# Patient Record
Sex: MALE | Race: WHITE | NOT HISPANIC OR LATINO | Employment: FULL TIME | URBAN - METROPOLITAN AREA
[De-identification: names, ages, dates, MRNs, and addresses within clinical notes are randomized per-mention and may not be internally consistent; named-entity substitution may affect disease eponyms.]

---

## 2018-06-18 ENCOUNTER — OFFICE VISIT (OUTPATIENT)
Dept: INTERNAL MEDICINE | Facility: IMAGING CENTER | Age: 63
End: 2018-06-18
Payer: MEDICARE

## 2018-06-18 VITALS
RESPIRATION RATE: 14 BRPM | DIASTOLIC BLOOD PRESSURE: 78 MMHG | HEART RATE: 58 BPM | OXYGEN SATURATION: 96 % | TEMPERATURE: 98.8 F | WEIGHT: 186 LBS | HEIGHT: 70 IN | BODY MASS INDEX: 26.63 KG/M2 | SYSTOLIC BLOOD PRESSURE: 126 MMHG

## 2018-06-18 DIAGNOSIS — R10.11 RIGHT UPPER QUADRANT ABDOMINAL PAIN: ICD-10-CM

## 2018-06-18 DIAGNOSIS — Z12.5 SCREENING FOR PROSTATE CANCER: ICD-10-CM

## 2018-06-18 DIAGNOSIS — Z79.01 LONG TERM CURRENT USE OF ANTICOAGULANT: ICD-10-CM

## 2018-06-18 DIAGNOSIS — Z80.0 FAMILY HISTORY OF COLON CANCER: ICD-10-CM

## 2018-06-18 DIAGNOSIS — D68.51 FACTOR 5 LEIDEN MUTATION, HETEROZYGOUS (HCC): ICD-10-CM

## 2018-06-18 DIAGNOSIS — Z86.711 HISTORY OF PULMONARY EMBOLUS (PE): ICD-10-CM

## 2018-06-18 DIAGNOSIS — E55.9 VITAMIN D DEFICIENCY: ICD-10-CM

## 2018-06-18 DIAGNOSIS — E78.00 HYPERCHOLESTEROLEMIA: ICD-10-CM

## 2018-06-18 DIAGNOSIS — E11.9 TYPE 2 DIABETES MELLITUS WITHOUT COMPLICATION, WITHOUT LONG-TERM CURRENT USE OF INSULIN (HCC): ICD-10-CM

## 2018-06-18 DIAGNOSIS — D68.59 HYPERCOAGULATION SYNDROME (HCC): ICD-10-CM

## 2018-06-18 DIAGNOSIS — Z86.718 HISTORY OF DVT (DEEP VEIN THROMBOSIS): ICD-10-CM

## 2018-06-18 PROCEDURE — 99214 OFFICE O/P EST MOD 30 MIN: CPT | Performed by: INTERNAL MEDICINE

## 2018-06-18 RX ORDER — RANITIDINE HCL 75 MG
75 TABLET ORAL EVERY EVENING
Qty: 30 TAB | Refills: 11 | COMMUNITY
Start: 2018-06-18 | End: 2020-06-12 | Stop reason: SINTOL

## 2018-06-18 RX ORDER — MULTIVIT-MIN/IRON/FOLIC ACID/K 18-600-40
1 CAPSULE ORAL DAILY
Qty: 30 CAP | COMMUNITY
Start: 2018-06-18

## 2018-06-18 ASSESSMENT — ENCOUNTER SYMPTOMS
WEIGHT LOSS: 0
DIZZINESS: 0
VOMITING: 0
CHILLS: 0
DIARRHEA: 0
BLOOD IN STOOL: 0
CONSTIPATION: 0
FEVER: 0
PALPITATIONS: 0
DIAPHORESIS: 0
SHORTNESS OF BREATH: 0
COUGH: 0
HEARTBURN: 1

## 2018-06-18 ASSESSMENT — PATIENT HEALTH QUESTIONNAIRE - PHQ9: CLINICAL INTERPRETATION OF PHQ2 SCORE: 0

## 2018-06-18 NOTE — PROGRESS NOTES
Established Patient Note   HPI:        States he has been having pain right lower abdominal pain beginning about one month ago has been intermittent and sharp with short duration. He cannot associate it with food.    Past Medical History:   Diagnosis Date   • Factor 5 Leiden mutation, heterozygous ([facility]) 6/18/2018   • Family history of colon cancer 6/18/2018   • History of DVT (deep vein thrombosis) 6/18/2018    2003 nonprovoked and July 2016 RLE nonprovoked   • History of pulmonary embolus (PE) 6/18/2018   • Hypercoagulation syndrome ([facility]) 6/18/2018   • Long term current use of anticoagulant 6/18/2018   • Type 2 diabetes mellitus without complication, without long-term current use of insulin ([facility]) 6/18/2018   • Vitamin D deficiency 6/18/2018       Current Outpatient Prescriptions   Medication Sig Dispense Refill   • rivaroxaban (XARELTO) 20 MG Tab tablet Take 20 mg by mouth with dinner.     • Cholecalciferol (VITAMIN D) 2000 units Cap Take 1 Cap by mouth every day. 30 Cap    • ranitidine (ZANTAC 75) 75 MG tablet Take 1 Tab by mouth every evening. 30 Tab 11     No current facility-administered medications for this visit.          No Known Allergies      Social History   Substance Use Topics   • Smoking status: Never Smoker   • Smokeless tobacco: Never Used   • Alcohol use Yes      Comment: 5-6 mixed drinks a week     History   Alcohol Use   • Yes     Comment: 5-6 mixed drinks a week     History   Drug Use No       Review of Systems   Constitutional: Negative for chills, diaphoresis, fever, malaise/fatigue and weight loss.   Respiratory: Negative for cough and shortness of breath.    Cardiovascular: Negative for chest pain, palpitations and leg swelling.   Gastrointestinal: Positive for heartburn. Negative for blood in stool, constipation, diarrhea, melena and vomiting.        Zantac 75 mg once a day to control heartburn   Genitourinary: Negative for dysuria.        Nocturia 2-4 times a night for years. No straining  "to urinate   Neurological: Negative for dizziness.       Ambulatory Vitals  /78   Pulse (!) 58   Temp 37.1 °C (98.8 °F)   Resp 14   Ht 1.778 m (5' 10\")   Wt 84.4 kg (186 lb)   SpO2 96%   BMI 26.69 kg/m²     Physical Exam   Constitutional: He is well-developed, well-nourished, and in no distress. No distress.   Neck: Neck supple. No JVD present. No thyromegaly present.   Cardiovascular: Normal rate, regular rhythm, normal heart sounds and intact distal pulses.  Exam reveals no gallop and no friction rub.    No murmur heard.  No carotid bruits   Pulmonary/Chest: Effort normal and breath sounds normal. He has no wheezes. He has no rales.   Abdominal: Soft. He exhibits no distension and no mass. There is tenderness. There is no guarding.   Mild tenderness RUQ   Genitourinary: Prostate normal.   Genitourinary Comments: No flank tenderness   Musculoskeletal: He exhibits no edema.   Neurological: He is alert. No cranial nerve deficit. Gait normal.   Skin: Skin is warm and dry. He is not diaphoretic.   Psychiatric: Mood and affect normal.         Assessment and Plan:     1. Right upper quadrant abdominal pain  US-ABDOMEN COMPLETE SURVEY    CBC WITH DIFF/PLATELET    URINALYSIS, COMPLETE    COMP METABOLIC PANEL   2. Factor 5 Leiden mutation, heterozygous (HCC)     3. History of pulmonary embolus (PE)     4. History of DVT (deep vein thrombosis)     5. Type 2 diabetes mellitus without complication, without long-term current use of insulin (HCC)  NMR LIPOPROFILE    HEMOGLOBIN A1C    MICROALBUM. UR RANDOM    COMP METABOLIC PANEL   6. Vitamin D deficiency  Cholecalciferol (VITAMIN D) 2000 units Cap    VITAMIN D 25-HYDROXY   7. Hypercoagulation syndrome (HCC)  TSH   8. Family history of colon cancer  CBC WITH DIFF/PLATELET   9. Long term current use of anticoagulant  CBC WITH DIFF/PLATELET    URINALYSIS, COMPLETE   10. Hypercholesterolemia  NMR LIPOPROFILE    CRP HIGH SENSITIVE    WESTERGREN SED RATE   11. Screening " for prostate cancer  PSA, SERUM (SERIAL MONITOR)    URINALYSIS, COMPLETE     Follow up 2 weeks after blood work and US abdomen completed unless symptoms worsen.    Face to face time: 45 minutes with greater than 50% of time spent with direct patient contact and medical management.       Prosper Duenas M.D.

## 2018-06-19 ENCOUNTER — NON-PROVIDER VISIT (OUTPATIENT)
Dept: INTERNAL MEDICINE | Facility: IMAGING CENTER | Age: 63
End: 2018-06-19
Payer: MEDICARE

## 2018-06-19 ENCOUNTER — HOSPITAL ENCOUNTER (OUTPATIENT)
Facility: MEDICAL CENTER | Age: 63
End: 2018-06-19
Attending: INTERNAL MEDICINE
Payer: MEDICARE

## 2018-06-19 LAB
25(OH)D3 SERPL-MCNC: 43 NG/ML (ref 30–100)
ALBUMIN SERPL BCP-MCNC: 5.1 G/DL (ref 3.2–4.9)
ALBUMIN/GLOB SERPL: 2.2 G/DL
ALP SERPL-CCNC: 47 U/L (ref 30–99)
ALT SERPL-CCNC: 22 U/L (ref 2–50)
ANION GAP SERPL CALC-SCNC: 8 MMOL/L (ref 0–11.9)
APPEARANCE UR: CLEAR
AST SERPL-CCNC: 20 U/L (ref 12–45)
BASOPHILS # BLD AUTO: 1.1 % (ref 0–1.8)
BASOPHILS # BLD: 0.06 K/UL (ref 0–0.12)
BILIRUB SERPL-MCNC: 0.7 MG/DL (ref 0.1–1.5)
BILIRUB UR QL STRIP.AUTO: NEGATIVE
BUN SERPL-MCNC: 17 MG/DL (ref 8–22)
CALCIUM SERPL-MCNC: 9.5 MG/DL (ref 8.5–10.5)
CHLORIDE SERPL-SCNC: 102 MMOL/L (ref 96–112)
CO2 SERPL-SCNC: 26 MMOL/L (ref 20–33)
COLOR UR: YELLOW
CREAT SERPL-MCNC: 1 MG/DL (ref 0.5–1.4)
CREAT UR-MCNC: 13.7 MG/DL
CRP SERPL HS-MCNC: 0.13 MG/DL (ref 0–0.75)
EOSINOPHIL # BLD AUTO: 0.11 K/UL (ref 0–0.51)
EOSINOPHIL NFR BLD: 2 % (ref 0–6.9)
ERYTHROCYTE [DISTWIDTH] IN BLOOD BY AUTOMATED COUNT: 46.5 FL (ref 35.9–50)
ERYTHROCYTE [SEDIMENTATION RATE] IN BLOOD BY WESTERGREN METHOD: 8 MM/HOUR (ref 0–20)
EST. AVERAGE GLUCOSE BLD GHB EST-MCNC: 123 MG/DL
GLOBULIN SER CALC-MCNC: 2.3 G/DL (ref 1.9–3.5)
GLUCOSE SERPL-MCNC: 110 MG/DL (ref 65–99)
GLUCOSE UR STRIP.AUTO-MCNC: NEGATIVE MG/DL
HBA1C MFR BLD: 5.9 % (ref 0–5.6)
HCT VFR BLD AUTO: 46.6 % (ref 42–52)
HGB BLD-MCNC: 15.4 G/DL (ref 14–18)
IMM GRANULOCYTES # BLD AUTO: 0.04 K/UL (ref 0–0.11)
IMM GRANULOCYTES NFR BLD AUTO: 0.7 % (ref 0–0.9)
KETONES UR STRIP.AUTO-MCNC: NEGATIVE MG/DL
LEUKOCYTE ESTERASE UR QL STRIP.AUTO: NEGATIVE
LYMPHOCYTES # BLD AUTO: 1.91 K/UL (ref 1–4.8)
LYMPHOCYTES NFR BLD: 34.5 % (ref 22–41)
MCH RBC QN AUTO: 31.9 PG (ref 27–33)
MCHC RBC AUTO-ENTMCNC: 33 G/DL (ref 33.7–35.3)
MCV RBC AUTO: 96.5 FL (ref 81.4–97.8)
MICRO URNS: NORMAL
MICROALBUMIN UR-MCNC: <0.7 MG/DL
MICROALBUMIN/CREAT UR: NORMAL MG/G (ref 0–30)
MONOCYTES # BLD AUTO: 0.46 K/UL (ref 0–0.85)
MONOCYTES NFR BLD AUTO: 8.3 % (ref 0–13.4)
NEUTROPHILS # BLD AUTO: 2.95 K/UL (ref 1.82–7.42)
NEUTROPHILS NFR BLD: 53.4 % (ref 44–72)
NITRITE UR QL STRIP.AUTO: NEGATIVE
NRBC # BLD AUTO: 0 K/UL
NRBC BLD-RTO: 0 /100 WBC
PH UR STRIP.AUTO: 6 [PH]
PLATELET # BLD AUTO: 219 K/UL (ref 164–446)
PMV BLD AUTO: 9.9 FL (ref 9–12.9)
POTASSIUM SERPL-SCNC: 4.1 MMOL/L (ref 3.6–5.5)
PROT SERPL-MCNC: 7.4 G/DL (ref 6–8.2)
PROT UR QL STRIP: NEGATIVE MG/DL
PSA SERPL-MCNC: 1.4 NG/ML (ref 0–4)
RBC # BLD AUTO: 4.83 M/UL (ref 4.7–6.1)
RBC UR QL AUTO: NEGATIVE
SODIUM SERPL-SCNC: 136 MMOL/L (ref 135–145)
SP GR UR STRIP.AUTO: 1
TSH SERPL DL<=0.005 MIU/L-ACNC: 2.58 UIU/ML (ref 0.38–5.33)
UROBILINOGEN UR STRIP.AUTO-MCNC: 0.2 MG/DL
WBC # BLD AUTO: 5.5 K/UL (ref 4.8–10.8)

## 2018-06-19 PROCEDURE — 82043 UR ALBUMIN QUANTITATIVE: CPT

## 2018-06-19 PROCEDURE — 82570 ASSAY OF URINE CREATININE: CPT

## 2018-06-19 PROCEDURE — 84153 ASSAY OF PSA TOTAL: CPT | Mod: GA

## 2018-06-19 PROCEDURE — 80053 COMPREHEN METABOLIC PANEL: CPT

## 2018-06-19 PROCEDURE — 85652 RBC SED RATE AUTOMATED: CPT

## 2018-06-19 PROCEDURE — 84443 ASSAY THYROID STIM HORMONE: CPT

## 2018-06-19 PROCEDURE — 85025 COMPLETE CBC W/AUTO DIFF WBC: CPT

## 2018-06-19 PROCEDURE — 82306 VITAMIN D 25 HYDROXY: CPT

## 2018-06-19 PROCEDURE — 81003 URINALYSIS AUTO W/O SCOPE: CPT

## 2018-06-19 PROCEDURE — 83036 HEMOGLOBIN GLYCOSYLATED A1C: CPT | Mod: GA

## 2018-06-19 PROCEDURE — 86140 C-REACTIVE PROTEIN: CPT

## 2018-06-21 ENCOUNTER — HOSPITAL ENCOUNTER (OUTPATIENT)
Dept: RADIOLOGY | Facility: MEDICAL CENTER | Age: 63
End: 2018-06-21
Attending: INTERNAL MEDICINE
Payer: MEDICARE

## 2018-06-21 DIAGNOSIS — R10.11 RIGHT UPPER QUADRANT ABDOMINAL PAIN: ICD-10-CM

## 2018-06-21 PROCEDURE — 76700 US EXAM ABDOM COMPLETE: CPT

## 2018-06-23 LAB
CHOLEST SERPL-MCNC: 193 MG/DL (ref 100–199)
HDL SERPL-SCNC: 34.4 UMOL/L
HDLC SERPL-MCNC: 60 MG/DL
LDL SERPL QN: 21.5 NM
LDL SERPL-SCNC: 1300 NMOL/L
LDL SMALL SERPL-SCNC: 298 NMOL/L
LDLC SERPL CALC-MCNC: 101 MG/DL (ref 0–99)
LP-IR SCORE SERPL: 29
TRIGL SERPL-MCNC: 160 MG/DL (ref 0–149)

## 2018-08-06 ENCOUNTER — OFFICE VISIT (OUTPATIENT)
Dept: INTERNAL MEDICINE | Facility: IMAGING CENTER | Age: 63
End: 2018-08-06
Payer: MEDICARE

## 2018-08-06 VITALS
WEIGHT: 186 LBS | SYSTOLIC BLOOD PRESSURE: 120 MMHG | BODY MASS INDEX: 26.63 KG/M2 | DIASTOLIC BLOOD PRESSURE: 62 MMHG | TEMPERATURE: 99 F | RESPIRATION RATE: 14 BRPM | HEART RATE: 64 BPM | OXYGEN SATURATION: 98 % | HEIGHT: 70 IN

## 2018-08-06 DIAGNOSIS — Z86.718 HISTORY OF DVT (DEEP VEIN THROMBOSIS): ICD-10-CM

## 2018-08-06 DIAGNOSIS — D68.51 FACTOR 5 LEIDEN MUTATION, HETEROZYGOUS (HCC): ICD-10-CM

## 2018-08-06 DIAGNOSIS — Z79.899 LONG TERM USE OF DRUG: ICD-10-CM

## 2018-08-06 DIAGNOSIS — R10.11 RIGHT UPPER QUADRANT ABDOMINAL PAIN: ICD-10-CM

## 2018-08-06 DIAGNOSIS — E11.9 TYPE 2 DIABETES MELLITUS WITHOUT COMPLICATION, WITHOUT LONG-TERM CURRENT USE OF INSULIN (HCC): ICD-10-CM

## 2018-08-06 PROCEDURE — 99214 OFFICE O/P EST MOD 30 MIN: CPT | Performed by: INTERNAL MEDICINE

## 2018-08-06 ASSESSMENT — ENCOUNTER SYMPTOMS: PALPITATIONS: 0

## 2018-08-06 NOTE — PROGRESS NOTES
"Established Patient Note   HPI:        States pain in right side is still present but not severe. The pain is typically short lived lasting seconds. He cannot identify any specific position or activity that aggravates the pain. Eating makes not difference affecting the symptoms.    Past Medical History:   Diagnosis Date   • Factor 5 Leiden mutation, heterozygous ([facility]) 6/18/2018   • Family history of colon cancer 6/18/2018   • History of DVT (deep vein thrombosis) 6/18/2018    2003 nonprovoked and July 2016 RLE nonprovoked   • History of pulmonary embolus (PE) 6/18/2018   • Hypercoagulation syndrome ([facility]) 6/18/2018   • Long term current use of anticoagulant 6/18/2018   • Type 2 diabetes mellitus without complication, without long-term current use of insulin ([facility]) 6/18/2018   • Vitamin D deficiency 6/18/2018       Current Outpatient Prescriptions   Medication Sig Dispense Refill   • rivaroxaban (XARELTO) 20 MG Tab tablet Take 20 mg by mouth with dinner.     • Cholecalciferol (VITAMIN D) 2000 units Cap Take 1 Cap by mouth every day. 30 Cap    • ranitidine (ZANTAC 75) 75 MG tablet Take 1 Tab by mouth every evening. 30 Tab 11     No current facility-administered medications for this visit.          No Known Allergies      Social History   Substance Use Topics   • Smoking status: Never Smoker   • Smokeless tobacco: Never Used   • Alcohol use Yes      Comment: 5-6 mixed drinks a week     History   Alcohol Use   • Yes     Comment: 5-6 mixed drinks a week     History   Drug Use No       Review of Systems   Cardiovascular: Negative for chest pain, palpitations and leg swelling.       Ambulatory Vitals  /62   Pulse 64   Temp 37.2 °C (99 °F)   Resp 14   Ht 1.778 m (5' 10\")   Wt 84.4 kg (186 lb)   SpO2 98%   BMI 26.69 kg/m²     Physical Exam   Constitutional: He is well-developed, well-nourished, and in no distress. No distress.   Neck: No JVD present.   Cardiovascular: Normal rate, regular rhythm and normal heart " sounds.  Exam reveals no gallop and no friction rub.    No murmur heard.  Pulmonary/Chest: Effort normal and breath sounds normal. He has no wheezes. He has no rales.   Abdominal: Soft. He exhibits no distension and no mass. There is no tenderness. There is no guarding.   Musculoskeletal: He exhibits no edema.   Neurological: He is alert. Gait normal.   Skin: He is not diaphoretic.   Psychiatric: Mood and affect normal.       Component      Latest Ref Rng & Units 6/19/2018   WBC      4.8 - 10.8 K/uL 5.5   RBC      4.70 - 6.10 M/uL 4.83   Hemoglobin      14.0 - 18.0 g/dL 15.4   Hematocrit      42.0 - 52.0 % 46.6   MCV      81.4 - 97.8 fL 96.5   MCH      27.0 - 33.0 pg 31.9   MCHC      33.7 - 35.3 g/dL 33.0 (L)   RDW      35.9 - 50.0 fL 46.5   Platelet Count      164 - 446 K/uL 219   MPV      9.0 - 12.9 fL 9.9   Neutrophils-Polys      44.00 - 72.00 % 53.40   Lymphocytes      22.00 - 41.00 % 34.50   Monocytes      0.00 - 13.40 % 8.30   Eosinophils      0.00 - 6.90 % 2.00   Basophils      0.00 - 1.80 % 1.10   Immature Granulocytes      0.00 - 0.90 % 0.70   Nucleated RBC      /100 WBC 0.00   Neutrophils (Absolute)      1.82 - 7.42 K/uL 2.95   Lymphs (Absolute)      1.00 - 4.80 K/uL 1.91   Monos (Absolute)      0.00 - 0.85 K/uL 0.46   Eos (Absolute)      0.00 - 0.51 K/uL 0.11   Baso (Absolute)      0.00 - 0.12 K/uL 0.06   Immature Granulocytes (abs)      0.00 - 0.11 K/uL 0.04   NRBC (Absolute)      K/uL 0.00   Sodium      135 - 145 mmol/L 136   Potassium      3.6 - 5.5 mmol/L 4.1   Chloride      96 - 112 mmol/L 102   Co2      20 - 33 mmol/L 26   Anion Gap      0.0 - 11.9 8.0   Glucose      65 - 99 mg/dL 110 (H)   Bun      8 - 22 mg/dL 17   Creatinine      0.50 - 1.40 mg/dL 1.00   Calcium      8.5 - 10.5 mg/dL 9.5   AST(SGOT)      12 - 45 U/L 20   ALT(SGPT)      2 - 50 U/L 22   Alkaline Phosphatase      30 - 99 U/L 47   Total Bilirubin      0.1 - 1.5 mg/dL 0.7   Albumin      3.2 - 4.9 g/dL 5.1 (H)   Total Protein      6.0  - 8.2 g/dL 7.4   Globulin      1.9 - 3.5 g/dL 2.3   A-G Ratio      g/dL 2.2   Color       Yellow   Character       Clear   Specific Gravity      <1.035 1.005   Ph      5.0 - 8.0 6.0   Glucose      Negative mg/dL Negative   Ketones      Negative mg/dL Negative   Protein      Negative mg/dL Negative   Bilirubin      Negative Negative   Urobilinogen, Urine      Negative 0.2   Nitrite      Negative Negative   Leukocyte Esterase      Negative Negative   Occult Blood      Negative Negative   Micro Urine Req       see below   Creatinine, Urine      mg/dL 13.70   Microalbumin, Urine Random      mg/dL <0.7   Micro Alb Creat Ratio      0 - 30 mg/g see below   Glycohemoglobin      0.0 - 5.6 % 5.9 (H)   Estim. Avg Glu      mg/dL 123   GFR If African American      >60 mL/min/1.73 m 2 >60   GFR If Non African American      >60 mL/min/1.73 m 2 >60   25-Hydroxy   Vitamin D 25      30 - 100 ng/mL 43   Prostatic Specific Antigen Tot      0.00 - 4.00 ng/mL 1.40   TSH      0.380 - 5.330 uIU/mL 2.580   Sed Rate Westergren      0 - 20 mm/hour 8   Stat C-Reactive Protein      0.00 - 0.75 mg/dL 0.13   Narrative       6/21/2018 9:14 AM    HISTORY/REASON FOR EXAM:  Right-sided abdominal pain      TECHNIQUE/EXAM DESCRIPTION AND NUMBER OF VIEWS:  Complete abdomen survey.    COMPARISON: None    FINDINGS:  Hepatic echotexture is increased. No hepatic mass is identified. Evaluation for mass is limited in the setting of increased hepatic echotexture. The liver measures 18.39 cm.    The gallbladder is normal. There is no evidence of cholelithiasis. The gallbladder wall thickness measures 0.18 cm  There is no pericholecystic fluid.    The common duct measures 0.21 cm.    The visualized pancreas is unremarkable.    The visualized aorta is normal in caliber.    Intrahepatic IVC is patent.    The portal vein is patent with hepatopetal flow.    The right kidney measures 10.38 cm.  The left kidney measures 11.08 cm.  There is no hydronephrosis.    The  spleen measures 9.92 cm maximally.    The bladder demonstrates no focal wall abnormality.    There is no ascites.   Impression       1.  Increased hepatic echotexture is likely related to fatty infiltration. Hepatocellular disease can have a similar appearance.    2.  Hepatomegaly   Reading Provider Reading Date   Wanda Whatley M.D. Jun 21, 2018   Signing Provider Signing Date Signing Time   Wanda Whatley M.D. Jun 21, 2018 10:06 AM       Assessment and Plan:     1. Right upper quadrant abdominal pain  CT-ABDOMEN WITH   2. Factor 5 Leiden mutation, heterozygous (HCC)     3. History of DVT (deep vein thrombosis)  CT-ABDOMEN WITH   4. Type 2 diabetes mellitus without complication, without long-term current use of insulin (HCC)  COMP METABOLIC PANEL    HEMOGLOBIN A1C    NMR LIPOPROFILE    Controlled with diet    5. Long term use of drug  COMP METABOLIC PANEL     Follow up 3 months with blood work one week before appointment.    Face to face time: 45 minutes with greater than 50% of time spent with direct patient contact and medical management.       Prosper Duenas M.D.

## 2018-08-28 DIAGNOSIS — R10.11 ABDOMINAL PAIN, RIGHT UPPER QUADRANT: ICD-10-CM

## 2018-08-28 DIAGNOSIS — Z86.718 PERSONAL HISTORY OF DEEP VEIN THROMBOSIS: ICD-10-CM

## 2018-09-21 ENCOUNTER — NON-PROVIDER VISIT (OUTPATIENT)
Dept: INTERNAL MEDICINE | Facility: IMAGING CENTER | Age: 63
End: 2018-09-21
Payer: MEDICARE

## 2018-09-21 ENCOUNTER — HOSPITAL ENCOUNTER (OUTPATIENT)
Facility: MEDICAL CENTER | Age: 63
End: 2018-09-21
Attending: INTERNAL MEDICINE
Payer: MEDICARE

## 2018-09-21 LAB
ALBUMIN SERPL BCP-MCNC: 4.9 G/DL (ref 3.2–4.9)
ALBUMIN/GLOB SERPL: 1.5 G/DL
ALP SERPL-CCNC: 52 U/L (ref 30–99)
ALT SERPL-CCNC: 28 U/L (ref 2–50)
ANION GAP SERPL CALC-SCNC: 10 MMOL/L (ref 0–11.9)
AST SERPL-CCNC: 26 U/L (ref 12–45)
BILIRUB SERPL-MCNC: 0.9 MG/DL (ref 0.1–1.5)
BUN SERPL-MCNC: 25 MG/DL (ref 8–22)
CALCIUM SERPL-MCNC: 10 MG/DL (ref 8.5–10.5)
CHLORIDE SERPL-SCNC: 102 MMOL/L (ref 96–112)
CO2 SERPL-SCNC: 24 MMOL/L (ref 20–33)
CREAT SERPL-MCNC: 1.23 MG/DL (ref 0.5–1.4)
EST. AVERAGE GLUCOSE BLD GHB EST-MCNC: 131 MG/DL
GLOBULIN SER CALC-MCNC: 3.3 G/DL (ref 1.9–3.5)
GLUCOSE SERPL-MCNC: 115 MG/DL (ref 65–99)
HBA1C MFR BLD: 6.2 % (ref 0–5.6)
POTASSIUM SERPL-SCNC: 4.4 MMOL/L (ref 3.6–5.5)
PROT SERPL-MCNC: 8.2 G/DL (ref 6–8.2)
SODIUM SERPL-SCNC: 136 MMOL/L (ref 135–145)

## 2018-09-21 PROCEDURE — 80053 COMPREHEN METABOLIC PANEL: CPT

## 2018-09-21 PROCEDURE — 83036 HEMOGLOBIN GLYCOSYLATED A1C: CPT | Mod: GA

## 2018-09-21 PROCEDURE — 83704 LIPOPROTEIN BLD QUAN PART: CPT

## 2018-09-21 PROCEDURE — 80061 LIPID PANEL: CPT | Mod: XU

## 2018-09-25 LAB
CHOLEST SERPL-MCNC: 228 MG/DL
HDL PARTICAL NO Q4363: >41 UMOL/L
HDL SIZE Q4361: 8.4 NM
HDLC SERPL-MCNC: 61 MG/DL (ref 40–59)
HLD.LARGE SERPL-SCNC: 3.4 UMOL/L
L VLDL PART NO Q4357: <1.5 NMOL/L
LDL SERPL QN: 20.9 NM
LDL SERPL-SCNC: 1617 NMOL/L
LDL SMALL SERPL-SCNC: 616 NMOL/L
LDLC SERPL CALC-MCNC: 134 MG/DL
PATHOLOGY STUDY: ABNORMAL
TRIGL SERPL-MCNC: 163 MG/DL (ref 30–149)
VLDL SIZE Q4362: 43.2 NM

## 2018-09-30 ENCOUNTER — HOSPITAL ENCOUNTER (OUTPATIENT)
Dept: RADIOLOGY | Facility: MEDICAL CENTER | Age: 63
End: 2018-09-30
Attending: INTERNAL MEDICINE
Payer: MEDICARE

## 2018-09-30 DIAGNOSIS — Z86.718 PERSONAL HISTORY OF DEEP VEIN THROMBOSIS: ICD-10-CM

## 2018-09-30 DIAGNOSIS — R10.11 ABDOMINAL PAIN, RIGHT UPPER QUADRANT: ICD-10-CM

## 2018-09-30 PROCEDURE — 74160 CT ABDOMEN W/CONTRAST: CPT

## 2018-09-30 PROCEDURE — 700117 HCHG RX CONTRAST REV CODE 255: Performed by: INTERNAL MEDICINE

## 2018-09-30 RX ADMIN — IOHEXOL 100 ML: 350 INJECTION, SOLUTION INTRAVENOUS at 09:41

## 2018-09-30 RX ADMIN — IOHEXOL 50 ML: 240 INJECTION, SOLUTION INTRATHECAL; INTRAVASCULAR; INTRAVENOUS; ORAL at 08:38

## 2019-09-30 DIAGNOSIS — D68.51 FACTOR 5 LEIDEN MUTATION, HETEROZYGOUS (HCC): ICD-10-CM

## 2019-09-30 DIAGNOSIS — Z00.00 WELLNESS EXAMINATION: ICD-10-CM

## 2019-09-30 DIAGNOSIS — D68.59 HYPERCOAGULATION SYNDROME (HCC): ICD-10-CM

## 2019-09-30 DIAGNOSIS — Z11.59 NEED FOR HEPATITIS C SCREENING TEST: ICD-10-CM

## 2019-09-30 DIAGNOSIS — Z12.5 SCREENING FOR PROSTATE CANCER: ICD-10-CM

## 2019-09-30 DIAGNOSIS — E55.9 VITAMIN D DEFICIENCY: ICD-10-CM

## 2019-09-30 DIAGNOSIS — E11.9 TYPE 2 DIABETES MELLITUS WITHOUT COMPLICATION, WITHOUT LONG-TERM CURRENT USE OF INSULIN (HCC): ICD-10-CM

## 2019-09-30 DIAGNOSIS — Z79.01 LONG TERM CURRENT USE OF ANTICOAGULANT: ICD-10-CM

## 2019-10-02 ENCOUNTER — HOSPITAL ENCOUNTER (OUTPATIENT)
Dept: LAB | Facility: MEDICAL CENTER | Age: 64
End: 2019-10-02
Attending: INTERNAL MEDICINE
Payer: MEDICARE

## 2019-10-02 DIAGNOSIS — E11.9 TYPE 2 DIABETES MELLITUS WITHOUT COMPLICATION, WITHOUT LONG-TERM CURRENT USE OF INSULIN (HCC): ICD-10-CM

## 2019-10-02 DIAGNOSIS — D68.59 HYPERCOAGULATION SYNDROME (HCC): ICD-10-CM

## 2019-10-02 DIAGNOSIS — E55.9 VITAMIN D DEFICIENCY: ICD-10-CM

## 2019-10-02 DIAGNOSIS — D68.51 FACTOR 5 LEIDEN MUTATION, HETEROZYGOUS (HCC): ICD-10-CM

## 2019-10-02 DIAGNOSIS — Z79.01 LONG TERM CURRENT USE OF ANTICOAGULANT: ICD-10-CM

## 2019-10-02 DIAGNOSIS — Z00.00 WELLNESS EXAMINATION: ICD-10-CM

## 2019-10-02 DIAGNOSIS — Z12.5 SCREENING FOR PROSTATE CANCER: ICD-10-CM

## 2019-10-02 DIAGNOSIS — Z11.59 NEED FOR HEPATITIS C SCREENING TEST: ICD-10-CM

## 2019-10-02 LAB
25(OH)D3 SERPL-MCNC: 38 NG/ML (ref 30–100)
ALBUMIN SERPL BCP-MCNC: 4.6 G/DL (ref 3.2–4.9)
ALBUMIN/GLOB SERPL: 1.8 G/DL
ALP SERPL-CCNC: 52 U/L (ref 30–99)
ALT SERPL-CCNC: 34 U/L (ref 2–50)
ANION GAP SERPL CALC-SCNC: 7 MMOL/L (ref 0–11.9)
APPEARANCE UR: CLEAR
AST SERPL-CCNC: 22 U/L (ref 12–45)
BASOPHILS # BLD AUTO: 1 % (ref 0–1.8)
BASOPHILS # BLD: 0.05 K/UL (ref 0–0.12)
BILIRUB SERPL-MCNC: 0.7 MG/DL (ref 0.1–1.5)
BILIRUB UR QL STRIP.AUTO: NEGATIVE
BUN SERPL-MCNC: 22 MG/DL (ref 8–22)
CALCIUM SERPL-MCNC: 9.6 MG/DL (ref 8.5–10.5)
CHLORIDE SERPL-SCNC: 104 MMOL/L (ref 96–112)
CO2 SERPL-SCNC: 26 MMOL/L (ref 20–33)
COLOR UR: YELLOW
CREAT SERPL-MCNC: 1.09 MG/DL (ref 0.5–1.4)
CREAT UR-MCNC: 16 MG/DL
EOSINOPHIL # BLD AUTO: 0.1 K/UL (ref 0–0.51)
EOSINOPHIL NFR BLD: 2 % (ref 0–6.9)
ERYTHROCYTE [DISTWIDTH] IN BLOOD BY AUTOMATED COUNT: 47.6 FL (ref 35.9–50)
GLOBULIN SER CALC-MCNC: 2.6 G/DL (ref 1.9–3.5)
GLUCOSE SERPL-MCNC: 109 MG/DL (ref 65–99)
GLUCOSE UR STRIP.AUTO-MCNC: NEGATIVE MG/DL
HCT VFR BLD AUTO: 44.5 % (ref 42–52)
HGB BLD-MCNC: 14.7 G/DL (ref 14–18)
IMM GRANULOCYTES # BLD AUTO: 0.02 K/UL (ref 0–0.11)
IMM GRANULOCYTES NFR BLD AUTO: 0.4 % (ref 0–0.9)
IRON SATN MFR SERPL: 44 % (ref 15–55)
IRON SERPL-MCNC: 138 UG/DL (ref 50–180)
KETONES UR STRIP.AUTO-MCNC: NEGATIVE MG/DL
LEUKOCYTE ESTERASE UR QL STRIP.AUTO: NEGATIVE
LYMPHOCYTES # BLD AUTO: 1.77 K/UL (ref 1–4.8)
LYMPHOCYTES NFR BLD: 35 % (ref 22–41)
MCH RBC QN AUTO: 32.2 PG (ref 27–33)
MCHC RBC AUTO-ENTMCNC: 33 G/DL (ref 33.7–35.3)
MCV RBC AUTO: 97.6 FL (ref 81.4–97.8)
MICRO URNS: NORMAL
MICROALBUMIN UR-MCNC: <0.7 MG/DL
MICROALBUMIN/CREAT UR: NORMAL MG/G (ref 0–30)
MONOCYTES # BLD AUTO: 0.54 K/UL (ref 0–0.85)
MONOCYTES NFR BLD AUTO: 10.7 % (ref 0–13.4)
NEUTROPHILS # BLD AUTO: 2.57 K/UL (ref 1.82–7.42)
NEUTROPHILS NFR BLD: 50.9 % (ref 44–72)
NITRITE UR QL STRIP.AUTO: NEGATIVE
NRBC # BLD AUTO: 0 K/UL
NRBC BLD-RTO: 0 /100 WBC
PH UR STRIP.AUTO: 6.5 [PH] (ref 5–8)
PLATELET # BLD AUTO: 195 K/UL (ref 164–446)
PMV BLD AUTO: 10.2 FL (ref 9–12.9)
POTASSIUM SERPL-SCNC: 4.3 MMOL/L (ref 3.6–5.5)
PROT SERPL-MCNC: 7.2 G/DL (ref 6–8.2)
PROT UR QL STRIP: NEGATIVE MG/DL
RBC # BLD AUTO: 4.56 M/UL (ref 4.7–6.1)
RBC UR QL AUTO: NEGATIVE
SODIUM SERPL-SCNC: 137 MMOL/L (ref 135–145)
SP GR UR STRIP.AUTO: 1.01
TIBC SERPL-MCNC: 312 UG/DL (ref 250–450)
UROBILINOGEN UR STRIP.AUTO-MCNC: 0.2 MG/DL
WBC # BLD AUTO: 5.1 K/UL (ref 4.8–10.8)

## 2019-10-02 PROCEDURE — 84443 ASSAY THYROID STIM HORMONE: CPT

## 2019-10-02 PROCEDURE — 84153 ASSAY OF PSA TOTAL: CPT | Mod: GA

## 2019-10-02 PROCEDURE — 86803 HEPATITIS C AB TEST: CPT

## 2019-10-02 PROCEDURE — 82043 UR ALBUMIN QUANTITATIVE: CPT

## 2019-10-02 PROCEDURE — 83036 HEMOGLOBIN GLYCOSYLATED A1C: CPT | Mod: GA

## 2019-10-02 PROCEDURE — 83090 ASSAY OF HOMOCYSTEINE: CPT

## 2019-10-02 PROCEDURE — 82306 VITAMIN D 25 HYDROXY: CPT

## 2019-10-02 PROCEDURE — 36415 COLL VENOUS BLD VENIPUNCTURE: CPT

## 2019-10-02 PROCEDURE — 85025 COMPLETE CBC W/AUTO DIFF WBC: CPT

## 2019-10-02 PROCEDURE — 83550 IRON BINDING TEST: CPT

## 2019-10-02 PROCEDURE — 82570 ASSAY OF URINE CREATININE: CPT

## 2019-10-02 PROCEDURE — 82607 VITAMIN B-12: CPT

## 2019-10-02 PROCEDURE — 80053 COMPREHEN METABOLIC PANEL: CPT

## 2019-10-02 PROCEDURE — 81003 URINALYSIS AUTO W/O SCOPE: CPT

## 2019-10-02 PROCEDURE — 83540 ASSAY OF IRON: CPT

## 2019-10-03 LAB
EST. AVERAGE GLUCOSE BLD GHB EST-MCNC: 128 MG/DL
HBA1C MFR BLD: 6.1 % (ref 0–5.6)
HCV AB SER QL: NEGATIVE
PSA SERPL-MCNC: 2.96 NG/ML (ref 0–4)
TSH SERPL DL<=0.005 MIU/L-ACNC: 2.11 UIU/ML (ref 0.38–5.33)
VIT B12 SERPL-MCNC: 289 PG/ML (ref 211–911)

## 2019-10-04 LAB — HCYS SERPL-SCNC: 9.75 UMOL/L

## 2019-10-07 DIAGNOSIS — E78.00 HYPERCHOLESTEROLEMIA: ICD-10-CM

## 2019-10-09 ENCOUNTER — HOSPITAL ENCOUNTER (OUTPATIENT)
Dept: LAB | Facility: MEDICAL CENTER | Age: 64
End: 2019-10-09
Attending: INTERNAL MEDICINE
Payer: MEDICARE

## 2019-10-09 DIAGNOSIS — E78.00 HYPERCHOLESTEROLEMIA: ICD-10-CM

## 2019-10-09 PROCEDURE — 83704 LIPOPROTEIN BLD QUAN PART: CPT

## 2019-10-09 PROCEDURE — 36415 COLL VENOUS BLD VENIPUNCTURE: CPT

## 2019-10-09 PROCEDURE — 80061 LIPID PANEL: CPT | Mod: XU

## 2019-10-14 LAB
CHOLEST SERPL-MCNC: 196 MG/DL
FASTING STATUS PATIENT QL REPORTED: NORMAL
HDL PARTICAL NO Q4363: >41 UMOL/L
HDL SIZE Q4361: 8.6 NM
HDLC SERPL-MCNC: 59 MG/DL (ref 40–59)
HLD.LARGE SERPL-SCNC: 4.8 UMOL/L
L VLDL PART NO Q4357: 1.6 NMOL/L
LDL SERPL QN: 20.9 NM
LDL SERPL-SCNC: 1269 NMOL/L
LDL SMALL SERPL-SCNC: 518 NMOL/L
LDLC SERPL CALC-MCNC: 108 MG/DL
PATHOLOGY STUDY: ABNORMAL
TRIGL SERPL-MCNC: 146 MG/DL (ref 30–149)
VLDL SIZE Q4362: 45.1 NM

## 2019-11-08 ENCOUNTER — OFFICE VISIT (OUTPATIENT)
Dept: INTERNAL MEDICINE | Facility: IMAGING CENTER | Age: 64
End: 2019-11-08
Payer: MEDICARE

## 2019-11-08 VITALS
WEIGHT: 185 LBS | HEIGHT: 70 IN | HEART RATE: 56 BPM | OXYGEN SATURATION: 95 % | SYSTOLIC BLOOD PRESSURE: 100 MMHG | RESPIRATION RATE: 14 BRPM | TEMPERATURE: 98.1 F | BODY MASS INDEX: 26.48 KG/M2 | DIASTOLIC BLOOD PRESSURE: 60 MMHG

## 2019-11-08 DIAGNOSIS — E53.8 B12 DEFICIENCY: ICD-10-CM

## 2019-11-08 DIAGNOSIS — N40.1 BPH ASSOCIATED WITH NOCTURIA: ICD-10-CM

## 2019-11-08 DIAGNOSIS — R35.1 BPH ASSOCIATED WITH NOCTURIA: ICD-10-CM

## 2019-11-08 DIAGNOSIS — R73.03 PREDIABETES: ICD-10-CM

## 2019-11-08 DIAGNOSIS — E66.3 OVERWEIGHT: ICD-10-CM

## 2019-11-08 DIAGNOSIS — Z23 NEED FOR PNEUMOCOCCAL VACCINATION: ICD-10-CM

## 2019-11-08 DIAGNOSIS — D68.51 FACTOR 5 LEIDEN MUTATION, HETEROZYGOUS (HCC): ICD-10-CM

## 2019-11-08 DIAGNOSIS — Z12.5 PROSTATE CANCER SCREENING: ICD-10-CM

## 2019-11-08 DIAGNOSIS — D68.59 HYPERCOAGULATION SYNDROME (HCC): ICD-10-CM

## 2019-11-08 DIAGNOSIS — E55.9 VITAMIN D DEFICIENCY: ICD-10-CM

## 2019-11-08 PROCEDURE — G0438 PPPS, INITIAL VISIT: HCPCS | Performed by: INTERNAL MEDICINE

## 2019-11-08 PROCEDURE — 90732 PPSV23 VACC 2 YRS+ SUBQ/IM: CPT | Performed by: INTERNAL MEDICINE

## 2019-11-08 PROCEDURE — G0009 ADMIN PNEUMOCOCCAL VACCINE: HCPCS | Performed by: INTERNAL MEDICINE

## 2019-11-08 PROCEDURE — 96372 THER/PROPH/DIAG INJ SC/IM: CPT | Performed by: INTERNAL MEDICINE

## 2019-11-08 RX ORDER — CYANOCOBALAMIN 1000 UG/ML
1000 INJECTION, SOLUTION INTRAMUSCULAR; SUBCUTANEOUS ONCE
Status: COMPLETED | OUTPATIENT
Start: 2019-11-08 | End: 2019-11-08

## 2019-11-08 RX ADMIN — CYANOCOBALAMIN 1000 MCG: 1000 INJECTION, SOLUTION INTRAMUSCULAR; SUBCUTANEOUS at 15:00

## 2019-11-08 ASSESSMENT — ACTIVITIES OF DAILY LIVING (ADL): BATHING_REQUIRES_ASSISTANCE: 0

## 2019-11-08 ASSESSMENT — PATIENT HEALTH QUESTIONNAIRE - PHQ9: CLINICAL INTERPRETATION OF PHQ2 SCORE: 0

## 2019-11-08 ASSESSMENT — ENCOUNTER SYMPTOMS: GENERAL WELL-BEING: EXCELLENT

## 2019-11-08 NOTE — PROGRESS NOTES
Established Patient Note   HPI:        Mehul is here today for annual medicare wellness and to follow up results of recent lab work. He has just started taking B12.    Past Medical History:   Diagnosis Date   • B12 deficiency 11/8/2019   • BPH associated with nocturia 11/8/2019   • Factor 5 Leiden mutation, heterozygous (HCC) 6/18/2018   • Family history of colon cancer 6/18/2018   • History of DVT (deep vein thrombosis) 6/18/2018    2003 nonprovoked and July 2016 RLE nonprovoked   • History of pulmonary embolus (PE) 6/18/2018   • Hypercoagulation syndrome (HCC) 6/18/2018   • Long term current use of anticoagulant 6/18/2018   • Prediabetes 6/18/2018   • Vitamin D deficiency 6/18/2018       Current Outpatient Medications   Medication Sig Dispense Refill   • Cyanocobalamin (VITAMIN B-12) 5000 MCG SL Tab Place 5,000 mcg under tongue every day.     • rivaroxaban (XARELTO) 20 MG Tab tablet Take 20 mg by mouth with dinner.     • Cholecalciferol (VITAMIN D) 2000 units Cap Take 1 Cap by mouth every day. 30 Cap    • ranitidine (ZANTAC 75) 75 MG tablet Take 1 Tab by mouth every evening. 30 Tab 11     No current facility-administered medications for this visit.          No Known Allergies      Social History     Tobacco Use   • Smoking status: Never Smoker   • Smokeless tobacco: Never Used   Substance Use Topics   • Alcohol use: Yes     Comment: 5-6 mixed drinks a week   • Drug use: No       Past Surgical History:   Procedure Laterality Date   • APPENDECTOMY      Age 6        Review of Systems   Genitourinary:        No urinary straining. Urine flow is somewhat decreased but has occurred over years. Nocturia 3-4 times per night which has been going on for over 7 years.       Depression Screening    Little interest or pleasure in doing things?  0 - not at all  Feeling down, depressed , or hopeless? 0 - not at all  Patient Health Questionnaire Score: 0     If depressive symptoms identified deferred to follow up visit unless  specifically addressed in assessment and plan.    Interpretation of PHQ-9 Total Score   Score Severity   1-4 No Depression   5-9 Mild Depression   10-14 Moderate Depression   15-19 Moderately Severe Depression   20-27 Severe Depression    Screening for Cognitive Impairment    Three Minute Recall (village, kitchen, baby) 0/3    Shamir clock face with all 12 numbers and set the hands to show 10 past 10.  Yes    Cognitive concerns identified deferred for follow up unless specifically addressed in assessment and plan.    Fall Risk Assessment    Has the patient had two or more falls in the last year or any fall with injury in the last year?  No    Safety Assessment    Throw rugs on floor.  No  Handrails on all stairs.  Yes  Good lighting in all hallways.  Yes  Difficulty hearing.  No  Patient counseled about all safety risks that were identified.    Functional Assessment ADLs    Are there any barriers preventing you from cooking for yourself or meeting nutritional needs?  No.    Are there any barriers preventing you from driving safely or obtaining transportation?  No.    Are there any barriers preventing you from using a telephone or calling for help?  No.    Are there any barriers preventing you from shopping?  No.    Are there any barriers preventing you from taking care of your own finances?  No.    Are there any barriers preventing you from managing your medications?  No.    Are there any barriers preventing you from showering, bathing or dressing yourself?  No.    Are you currently engaging in any exercise or physical activity?  Yes.     What is your perception of your health?  Excellent.      Health Maintenance Summary                Annual Wellness Visit Overdue 1955     DIABETES MONOFILAMENT / LE EXAM Overdue 1955     RETINAL SCREENING Overdue 1/24/1971     COLONOSCOPY Overdue 1/24/2003     IMM PNEUMOCOCCAL VACCINE: 65+ Years Overdue 10/1/2018      Done 10/1/2017 Imm Admin: Pneumococcal Conjugate Vaccine  "(Prevnar/PCV-13)    IMM INFLUENZA Overdue 9/1/2019      Done 10/9/2018 Imm Admin: Influenza Vaccine Quad Inj (Preserved)    IMM ZOSTER VACCINES Postponed 4/8/2020 Originally 11/26/2014. System: vaccine not available, other system reasons     Done 10/1/2014 Imm Admin: Zoster Vaccine Live (ZVL) (Zostavax)    A1C SCREENING Next Due 4/2/2020      Done 10/2/2019 HEMOGLOBIN A1C     Patient has more history with this topic...    URINE ACR / MICROALBUMIN Next Due 10/2/2020      Done 10/2/2019 MICROALBUMIN CREAT RATIO URINE     Patient has more history with this topic...    SERUM CREATININE Next Due 10/2/2020      Done 10/2/2019 COMP METABOLIC PANEL     Patient has more history with this topic...    FASTING LIPID PROFILE Next Due 10/9/2020      Done 10/9/2019 LIPOPROTEIN QT BLOOD BY NMR     Patient has more history with this topic...    IMM DTaP/Tdap/Td Vaccine Next Due 10/1/2024      Done 10/1/2014 Imm Admin: Tdap Vaccine          Patient Care Team:  Prosper Duenas M.D. as PCP - General (Internal Medicine)     Ambulatory Vitals  /60 (BP Location: Left arm, Patient Position: Sitting, BP Cuff Size: Adult)   Pulse (!) 56   Temp 36.7 °C (98.1 °F) (Temporal)   Resp 14   Ht 1.778 m (5' 10\")   Wt 83.9 kg (185 lb)   SpO2 95%   BMI 26.54 kg/m²     Physical Exam   Constitutional: He is well-developed, well-nourished, and in no distress. No distress.   Cardiovascular: Normal rate, regular rhythm and normal heart sounds. Exam reveals no gallop and no friction rub.   No murmur heard.  Pulmonary/Chest: Effort normal and breath sounds normal. He has no wheezes. He has no rales.   Genitourinary:    Genitourinary Comments: Prostate mildly enlarged.     Musculoskeletal:         General: No edema.   Skin: He is not diaphoretic.       Component      Latest Ref Rng & Units 6/19/2018 9/21/2018 10/2/2019 10/9/2019   WBC      4.8 - 10.8 K/uL 5.5  5.1    RBC      4.70 - 6.10 M/uL 4.83  4.56 (L)    Hemoglobin      14.0 - 18.0 g/dL 15.4  " 14.7    Hematocrit      42.0 - 52.0 % 46.6  44.5    MCV      81.4 - 97.8 fL 96.5  97.6    MCH      27.0 - 33.0 pg 31.9  32.2    MCHC      33.7 - 35.3 g/dL 33.0 (L)  33.0 (L)    RDW      35.9 - 50.0 fL 46.5  47.6    Platelet Count      164 - 446 K/uL 219  195    MPV      9.0 - 12.9 fL 9.9  10.2    Neutrophils-Polys      44.00 - 72.00 % 53.40  50.90    Lymphocytes      22.00 - 41.00 % 34.50  35.00    Monocytes      0.00 - 13.40 % 8.30  10.70    Eosinophils      0.00 - 6.90 % 2.00  2.00    Basophils      0.00 - 1.80 % 1.10  1.00    Immature Granulocytes      0.00 - 0.90 % 0.70  0.40    Nucleated RBC      /100 WBC 0.00  0.00    Neutrophils (Absolute)      1.82 - 7.42 K/uL 2.95  2.57    Lymphs (Absolute)      1.00 - 4.80 K/uL 1.91  1.77    Monos (Absolute)      0.00 - 0.85 K/uL 0.46  0.54    Eos (Absolute)      0.00 - 0.51 K/uL 0.11  0.10    Baso (Absolute)      0.00 - 0.12 K/uL 0.06  0.05    Immature Granulocytes (abs)      0.00 - 0.11 K/uL 0.04  0.02    NRBC (Absolute)      K/uL 0.00  0.00    Sodium      135 - 145 mmol/L 136 136 137    Potassium      3.6 - 5.5 mmol/L 4.1 4.4 4.3    Chloride      96 - 112 mmol/L 102 102 104    Co2      20 - 33 mmol/L 26 24 26    Anion Gap      0.0 - 11.9 8.0 10.0 7.0    Glucose      65 - 99 mg/dL 110 (H) 115 (H) 109 (H)    Bun      8 - 22 mg/dL 17 25 (H) 22    Creatinine      0.50 - 1.40 mg/dL 1.00 1.23 1.09    Calcium      8.5 - 10.5 mg/dL 9.5 10.0 9.6    AST(SGOT)      12 - 45 U/L 20 26 22    ALT(SGPT)      2 - 50 U/L 22 28 34    Alkaline Phosphatase      30 - 99 U/L 47 52 52    Total Bilirubin      0.1 - 1.5 mg/dL 0.7 0.9 0.7    Albumin      3.2 - 4.9 g/dL 5.1 (H) 4.9 4.6    Total Protein      6.0 - 8.2 g/dL 7.4 8.2 7.2    Globulin      1.9 - 3.5 g/dL 2.3 3.3 2.6    A-G Ratio      g/dL 2.2 1.5 1.8    Color       Yellow  Yellow    Character       Clear  Clear    Specific Gravity      <1.035 1.005  1.007    Ph      5.0 - 8.0 6.0  6.5    Glucose      Negative mg/dL Negative  Negative       Ketones      Negative mg/dL Negative  Negative    Protein      Negative mg/dL Negative  Negative    Bilirubin      Negative Negative  Negative    Urobilinogen, Urine      Negative 0.2  0.2    Nitrite      Negative Negative  Negative    Leukocyte Esterase      Negative Negative  Negative    Occult Blood      Negative Negative  Negative    Micro Urine Req       see below  see below    Cholesterol,Tot      <=199 mg/dL 193 228 (H)  196   Triglycerides      30 - 149 mg/dL 160 (H) 163 (H)  146   HDL      40 - 59 mg/dL 60 61 (H)  59   LDL Cholesterol      <=129 mg/dL  134 (H)  108   LDL Particle      <=1135 nmol/L  1617 (H)  1269 (H)   Small LDL      <=634 nmol/L  616  518   L-VLDL Particle No.      <=2.7 nmol/L  <1.5  1.6   HDL Particle No.      >=33.0 umol/L  >41.0  >41.0   L-HDL Particle No.      >=4.2 umol/L  3.4 (L)  4.8   LDL Particle Size      >=20.7 nm  20.9  20.9   VLDL Size      <=46.7 nm  43.2  45.1   HDL Size      >=8.9 nm  8.4 (L)  8.6 (L)   EER LipoFit by NMR        See Note  See Note   LDL-P      <1,000 nmol/L 1,300 (H)      LDL-C      0 - 99 mg/dL 101 (H)      HDL-P (Total)      >=30.5 umol/L 34.4      Small LDL-P      <=527 nmol/L 298      LDL Size      >20.5 nm 21.5      LP-IR Score      <=45 29      Creatinine, Urine      mg/dL 13.70  16.00    Microalbumin, Urine Random      mg/dL <0.7  <0.7    Micro Alb Creat Ratio      0 - 30 mg/g see below  see below    Iron      50 - 180 ug/dL   138    Total Iron Binding      250 - 450 ug/dL   312    % Saturation      15 - 55 %   44    Glycohemoglobin      0.0 - 5.6 % 5.9 (H) 6.2 (H) 6.1 (H)    Estim. Avg Glu      mg/dL 123 131 128    GFR If African American      >60 mL/min/1.73 m 2 >60 >60 >60    GFR If Non African American      >60 mL/min/1.73 m 2 >60 59 (A) >60    25-Hydroxy   Vitamin D 25      30 - 100 ng/mL 43  38    Prostatic Specific Antigen Tot      0.00 - 4.00 ng/mL 1.40  2.96    TSH      0.380 - 5.330 uIU/mL 2.580  2.110    Sed Rate [facility]      0 - 20  mm/hour 8      Stat C-Reactive Protein      0.00 - 0.75 mg/dL 0.13      Hepatitis C Antibody      Negative   Negative    Vitamin B12 -True Cobalamin      211 - 911 pg/mL   289    Homocysteine      <11.00 umol/L   9.75    Fasting Status          Fasting     Assessment and Plan:     1. Factor 5 Leiden mutation, heterozygous (HCC)     2. Hypercoagulation syndrome (HCC)  CBC WITH DIFFERENTIAL   3. B12 deficiency  VITAMIN B12    VITAMIN B12    CBC WITH DIFFERENTIAL   4. Vitamin D deficiency  VITAMIN D,25 HYDROXY   5. Prediabetes  HEMOGLOBIN A1C    Comp Metabolic Panel    Lipid Profile    TSH   6. BPH associated with nocturia  URINALYSIS    Comp Metabolic Panel   7. Prostate cancer screening  PROSTATE SPECIFIC AG SCREENING   8. Overweight   Lipid Profile     He does not wish to start Rx for BPH as yet due to concerns over potential side effects; if he wishes to start Rx he will call and start flomax 0.4 mg qd. Continue B12. Recommend arterial vascular screening. Check B12 level in 6 months.  Face to face time: 35 minutes with greater than 50% of time spent with direct patient contact and medical management.     Prosper Duenas M.D.

## 2019-11-09 PROBLEM — K57.30 DIVERTICULOSIS OF COLON: Status: ACTIVE | Noted: 2019-11-09

## 2020-02-13 ENCOUNTER — TELEPHONE (OUTPATIENT)
Dept: INTERNAL MEDICINE | Facility: IMAGING CENTER | Age: 65
End: 2020-02-13

## 2020-02-13 RX ORDER — ONDANSETRON 4 MG/1
4 TABLET, ORALLY DISINTEGRATING ORAL EVERY 6 HOURS PRN
Qty: 10 TAB | Refills: 0 | Status: SHIPPED | OUTPATIENT
Start: 2020-02-13 | End: 2020-10-14

## 2020-02-13 NOTE — TELEPHONE ENCOUNTER
Patient's wife and family acquaintance with recent gastroenteritis.  Requesting antiemetic.  Patient instructed to call if no improvement in 24 hours.

## 2020-03-18 DIAGNOSIS — D68.51 FACTOR 5 LEIDEN MUTATION, HETEROZYGOUS (HCC): ICD-10-CM

## 2020-03-18 DIAGNOSIS — Z86.711 HISTORY OF PULMONARY EMBOLUS (PE): ICD-10-CM

## 2020-03-18 DIAGNOSIS — Z86.718 HISTORY OF DVT (DEEP VEIN THROMBOSIS): ICD-10-CM

## 2020-06-12 ENCOUNTER — OFFICE VISIT (OUTPATIENT)
Dept: INTERNAL MEDICINE | Facility: IMAGING CENTER | Age: 65
End: 2020-06-12
Payer: MEDICARE

## 2020-06-12 VITALS
RESPIRATION RATE: 14 BRPM | HEART RATE: 57 BPM | WEIGHT: 192 LBS | SYSTOLIC BLOOD PRESSURE: 100 MMHG | OXYGEN SATURATION: 95 % | BODY MASS INDEX: 27.49 KG/M2 | HEIGHT: 70 IN | TEMPERATURE: 97.6 F | DIASTOLIC BLOOD PRESSURE: 50 MMHG

## 2020-06-12 DIAGNOSIS — K57.30 DIVERTICULOSIS OF COLON: ICD-10-CM

## 2020-06-12 DIAGNOSIS — R10.9 ABDOMINAL DISCOMFORT: ICD-10-CM

## 2020-06-12 PROCEDURE — 99213 OFFICE O/P EST LOW 20 MIN: CPT | Performed by: INTERNAL MEDICINE

## 2020-06-12 RX ORDER — FAMOTIDINE 20 MG/1
20 TABLET, FILM COATED ORAL DAILY
COMMUNITY

## 2020-06-12 ASSESSMENT — ENCOUNTER SYMPTOMS
DIARRHEA: 0
NAUSEA: 0

## 2020-06-12 ASSESSMENT — FIBROSIS 4 INDEX: FIB4 SCORE: 1.3

## 2020-06-12 NOTE — PROGRESS NOTES
Established Patient Note   HPI:        Mehul comes in today with complaint of abdominal discomfort for past week that is somewhat sharp at times. No change in bowel function. He is prone to occasional constipation but has not been any worse over past week.    Past Medical History:   Diagnosis Date   • B12 deficiency 11/8/2019   • BPH associated with nocturia 11/8/2019   • Diverticulosis of colon 11/9/2019    Colonoscopy Dec. 2014: Sigmoid diverticulosis mild   • Factor 5 Leiden mutation, heterozygous (HCC) 6/18/2018   • Family history of colon cancer 6/18/2018   • History of DVT (deep vein thrombosis) 6/18/2018    2003 nonprovoked and July 2016 RLE nonprovoked   • History of pulmonary embolus (PE) 6/18/2018   • Hypercoagulation syndrome (HCC) 6/18/2018   • Long term current use of anticoagulant 6/18/2018   • Prediabetes 6/18/2018   • Vitamin D deficiency 6/18/2018       Current Outpatient Medications   Medication Sig Dispense Refill   • famotidine (PEPCID) 20 MG Tab Take 20 mg by mouth every day.     • rivaroxaban (XARELTO) 20 MG Tab tablet Take 1 Tab by mouth with dinner. 90 Tab 3   • ondansetron (ZOFRAN ODT) 4 MG TABLET DISPERSIBLE Take 1 Tab by mouth every 6 hours as needed for Nausea. 10 Tab 0   • Cyanocobalamin (VITAMIN B-12) 5000 MCG SL Tab Place 5,000 mcg under tongue every day.     • Cholecalciferol (VITAMIN D) 2000 units Cap Take 1 Cap by mouth every day. 30 Cap      No current facility-administered medications for this visit.          No Known Allergies      Social History     Tobacco Use   • Smoking status: Never Smoker   • Smokeless tobacco: Never Used   Substance Use Topics   • Alcohol use: Yes     Comment: 5-6 mixed drinks a week   • Drug use: No       Past Surgical History:   Procedure Laterality Date   • APPENDECTOMY      Age 6        Review of Systems   Gastrointestinal: Negative for diarrhea and nausea.       Ambulatory Vitals  /50 (BP Location: Left arm, Patient Position: Sitting, BP Cuff  "Size: Adult)   Pulse (!) 57   Temp 36.4 °C (97.6 °F) (Temporal)   Resp 14   Ht 1.778 m (5' 10\")   Wt 87.1 kg (192 lb)   SpO2 95%   BMI 27.55 kg/m²     Physical Exam   Pulmonary/Chest:   Palpation over right anterior ribs without pain.   Abdominal: Soft. He exhibits no distension and no mass. There is no abdominal tenderness. There is no guarding.   Genitourinary:    Genitourinary Comments: No flank or spinal tenderness.         Assessment and Plan:     1. Diverticulosis of colon     2. Abdominal discomfort       Discussed since exam is without signficant tenderness will not order CT abdomen/pelvis at this time or start any antibiotics. If symptoms worsen significantly he will call and may require work up.    Prosper Duenas M.D.  "

## 2020-09-24 DIAGNOSIS — Z79.01 LONG TERM CURRENT USE OF ANTICOAGULANT: ICD-10-CM

## 2020-09-24 DIAGNOSIS — E55.9 VITAMIN D DEFICIENCY: ICD-10-CM

## 2020-09-24 DIAGNOSIS — K57.30 DIVERTICULOSIS OF COLON: ICD-10-CM

## 2020-09-24 DIAGNOSIS — E53.8 B12 DEFICIENCY: ICD-10-CM

## 2020-09-24 DIAGNOSIS — R73.03 PREDIABETES: ICD-10-CM

## 2020-10-06 DIAGNOSIS — R68.82 LIBIDO, DECREASED: ICD-10-CM

## 2020-10-08 ENCOUNTER — HOSPITAL ENCOUNTER (OUTPATIENT)
Dept: LAB | Facility: MEDICAL CENTER | Age: 65
End: 2020-10-08
Attending: INTERNAL MEDICINE
Payer: MEDICARE

## 2020-10-08 DIAGNOSIS — R35.1 BPH ASSOCIATED WITH NOCTURIA: ICD-10-CM

## 2020-10-08 DIAGNOSIS — R73.03 PREDIABETES: ICD-10-CM

## 2020-10-08 DIAGNOSIS — R68.82 LIBIDO, DECREASED: ICD-10-CM

## 2020-10-08 DIAGNOSIS — Z12.5 PROSTATE CANCER SCREENING: ICD-10-CM

## 2020-10-08 DIAGNOSIS — N40.1 BPH ASSOCIATED WITH NOCTURIA: ICD-10-CM

## 2020-10-08 DIAGNOSIS — E66.3 OVERWEIGHT: ICD-10-CM

## 2020-10-08 DIAGNOSIS — E55.9 VITAMIN D DEFICIENCY: ICD-10-CM

## 2020-10-08 DIAGNOSIS — E53.8 B12 DEFICIENCY: ICD-10-CM

## 2020-10-08 DIAGNOSIS — D68.59 HYPERCOAGULATION SYNDROME (HCC): ICD-10-CM

## 2020-10-08 LAB
ALBUMIN SERPL BCP-MCNC: 5 G/DL (ref 3.2–4.9)
ALBUMIN/GLOB SERPL: 1.8 G/DL
ALP SERPL-CCNC: 62 U/L (ref 30–99)
ALT SERPL-CCNC: 26 U/L (ref 2–50)
ANION GAP SERPL CALC-SCNC: 10 MMOL/L (ref 7–16)
APPEARANCE UR: CLEAR
AST SERPL-CCNC: 22 U/L (ref 12–45)
BASOPHILS # BLD AUTO: 0.5 % (ref 0–1.8)
BASOPHILS # BLD: 0.03 K/UL (ref 0–0.12)
BILIRUB SERPL-MCNC: 0.7 MG/DL (ref 0.1–1.5)
BILIRUB UR QL STRIP.AUTO: NEGATIVE
BUN SERPL-MCNC: 21 MG/DL (ref 8–22)
CALCIUM SERPL-MCNC: 9.4 MG/DL (ref 8.5–10.5)
CHLORIDE SERPL-SCNC: 99 MMOL/L (ref 96–112)
CHOLEST SERPL-MCNC: 196 MG/DL (ref 100–199)
CO2 SERPL-SCNC: 25 MMOL/L (ref 20–33)
COLOR UR: YELLOW
CREAT SERPL-MCNC: 1.05 MG/DL (ref 0.5–1.4)
EOSINOPHIL # BLD AUTO: 0.07 K/UL (ref 0–0.51)
EOSINOPHIL NFR BLD: 1.3 % (ref 0–6.9)
ERYTHROCYTE [DISTWIDTH] IN BLOOD BY AUTOMATED COUNT: 47.7 FL (ref 35.9–50)
EST. AVERAGE GLUCOSE BLD GHB EST-MCNC: 128 MG/DL
FSH SERPL-ACNC: 7.2 MIU/ML (ref 1.5–12.4)
GLOBULIN SER CALC-MCNC: 2.8 G/DL (ref 1.9–3.5)
GLUCOSE SERPL-MCNC: 113 MG/DL (ref 65–99)
GLUCOSE UR STRIP.AUTO-MCNC: NEGATIVE MG/DL
HBA1C MFR BLD: 6.1 % (ref 0–5.6)
HCT VFR BLD AUTO: 45.3 % (ref 42–52)
HDLC SERPL-MCNC: 60 MG/DL
HGB BLD-MCNC: 15 G/DL (ref 14–18)
IMM GRANULOCYTES # BLD AUTO: 0.03 K/UL (ref 0–0.11)
IMM GRANULOCYTES NFR BLD AUTO: 0.5 % (ref 0–0.9)
KETONES UR STRIP.AUTO-MCNC: NEGATIVE MG/DL
LDLC SERPL CALC-MCNC: 110 MG/DL
LEUKOCYTE ESTERASE UR QL STRIP.AUTO: NEGATIVE
LH SERPL-ACNC: 6.9 IU/L (ref 1.7–8.6)
LYMPHOCYTES # BLD AUTO: 2.14 K/UL (ref 1–4.8)
LYMPHOCYTES NFR BLD: 38.8 % (ref 22–41)
MCH RBC QN AUTO: 32.1 PG (ref 27–33)
MCHC RBC AUTO-ENTMCNC: 33.1 G/DL (ref 33.7–35.3)
MCV RBC AUTO: 96.8 FL (ref 81.4–97.8)
MICRO URNS: NORMAL
MONOCYTES # BLD AUTO: 0.52 K/UL (ref 0–0.85)
MONOCYTES NFR BLD AUTO: 9.4 % (ref 0–13.4)
NEUTROPHILS # BLD AUTO: 2.72 K/UL (ref 1.82–7.42)
NEUTROPHILS NFR BLD: 49.5 % (ref 44–72)
NITRITE UR QL STRIP.AUTO: NEGATIVE
NRBC # BLD AUTO: 0 K/UL
NRBC BLD-RTO: 0 /100 WBC
PH UR STRIP.AUTO: 6.5 [PH] (ref 5–8)
PLATELET # BLD AUTO: 219 K/UL (ref 164–446)
PMV BLD AUTO: 10.2 FL (ref 9–12.9)
POTASSIUM SERPL-SCNC: 4.1 MMOL/L (ref 3.6–5.5)
PROT SERPL-MCNC: 7.8 G/DL (ref 6–8.2)
PROT UR QL STRIP: NEGATIVE MG/DL
PSA SERPL-MCNC: 3.83 NG/ML (ref 0–4)
RBC # BLD AUTO: 4.68 M/UL (ref 4.7–6.1)
RBC UR QL AUTO: NEGATIVE
SODIUM SERPL-SCNC: 134 MMOL/L (ref 135–145)
SP GR UR STRIP.AUTO: 1.01
TRIGL SERPL-MCNC: 131 MG/DL (ref 0–149)
TSH SERPL DL<=0.005 MIU/L-ACNC: 2.5 UIU/ML (ref 0.38–5.33)
UROBILINOGEN UR STRIP.AUTO-MCNC: 0.2 MG/DL
VIT B12 SERPL-MCNC: 3246 PG/ML (ref 211–911)
WBC # BLD AUTO: 5.5 K/UL (ref 4.8–10.8)

## 2020-10-08 PROCEDURE — 81003 URINALYSIS AUTO W/O SCOPE: CPT

## 2020-10-08 PROCEDURE — 84443 ASSAY THYROID STIM HORMONE: CPT

## 2020-10-08 PROCEDURE — 84270 ASSAY OF SEX HORMONE GLOBUL: CPT

## 2020-10-08 PROCEDURE — 80053 COMPREHEN METABOLIC PANEL: CPT

## 2020-10-08 PROCEDURE — 82306 VITAMIN D 25 HYDROXY: CPT

## 2020-10-08 PROCEDURE — 84403 ASSAY OF TOTAL TESTOSTERONE: CPT

## 2020-10-08 PROCEDURE — 83036 HEMOGLOBIN GLYCOSYLATED A1C: CPT | Mod: GA

## 2020-10-08 PROCEDURE — 85025 COMPLETE CBC W/AUTO DIFF WBC: CPT

## 2020-10-08 PROCEDURE — 82607 VITAMIN B-12: CPT

## 2020-10-08 PROCEDURE — 84402 ASSAY OF FREE TESTOSTERONE: CPT

## 2020-10-08 PROCEDURE — 80061 LIPID PANEL: CPT

## 2020-10-08 PROCEDURE — 84153 ASSAY OF PSA TOTAL: CPT | Mod: GA

## 2020-10-08 PROCEDURE — 36415 COLL VENOUS BLD VENIPUNCTURE: CPT

## 2020-10-08 PROCEDURE — 83001 ASSAY OF GONADOTROPIN (FSH): CPT

## 2020-10-08 PROCEDURE — 83002 ASSAY OF GONADOTROPIN (LH): CPT

## 2020-10-09 LAB — 25(OH)D3 SERPL-MCNC: 51 NG/ML (ref 30–100)

## 2020-10-11 LAB
SHBG SERPL-SCNC: 35 NMOL/L (ref 11–80)
TESTOST FREE MFR SERPL: 1.8 % (ref 1.6–2.9)
TESTOST FREE SERPL-MCNC: 73 PG/ML (ref 47–244)
TESTOST SERPL-MCNC: 407 NG/DL (ref 300–720)

## 2020-10-14 ENCOUNTER — OFFICE VISIT (OUTPATIENT)
Dept: INTERNAL MEDICINE | Facility: IMAGING CENTER | Age: 65
End: 2020-10-14
Payer: MEDICARE

## 2020-10-14 VITALS
SYSTOLIC BLOOD PRESSURE: 110 MMHG | RESPIRATION RATE: 14 BRPM | HEART RATE: 57 BPM | OXYGEN SATURATION: 96 % | TEMPERATURE: 98.3 F | HEIGHT: 70 IN | WEIGHT: 190 LBS | BODY MASS INDEX: 27.2 KG/M2 | DIASTOLIC BLOOD PRESSURE: 60 MMHG

## 2020-10-14 DIAGNOSIS — Z86.39 HISTORY OF VITAMIN D DEFICIENCY: ICD-10-CM

## 2020-10-14 DIAGNOSIS — D68.51 FACTOR 5 LEIDEN MUTATION, HETEROZYGOUS (HCC): ICD-10-CM

## 2020-10-14 DIAGNOSIS — R73.03 PREDIABETES: ICD-10-CM

## 2020-10-14 DIAGNOSIS — Z86.39 HISTORY OF NON ANEMIC VITAMIN B12 DEFICIENCY: ICD-10-CM

## 2020-10-14 DIAGNOSIS — Z12.5 PROSTATE CANCER SCREENING: ICD-10-CM

## 2020-10-14 DIAGNOSIS — R53.83 DECREASED ENERGY: ICD-10-CM

## 2020-10-14 PROCEDURE — 99214 OFFICE O/P EST MOD 30 MIN: CPT | Performed by: INTERNAL MEDICINE

## 2020-10-14 ASSESSMENT — FIBROSIS 4 INDEX: FIB4 SCORE: 1.32

## 2020-10-14 ASSESSMENT — PATIENT HEALTH QUESTIONNAIRE - PHQ9: CLINICAL INTERPRETATION OF PHQ2 SCORE: 0

## 2020-10-14 NOTE — PROGRESS NOTES
"Established Patient Note   HPI:        Mehul is here today to follow up prediabetes, factor 5 disorder and B12 deficiency; he has done recent lab work.    Past Medical History:   Diagnosis Date   • B12 deficiency 11/8/2019   • BPH associated with nocturia 11/8/2019   • Diverticulosis of colon 11/9/2019    Colonoscopy Dec. 2014: Sigmoid diverticulosis mild   • Factor 5 Leiden mutation, heterozygous (HCC) 6/18/2018   • Family history of colon cancer 6/18/2018   • History of DVT (deep vein thrombosis) 6/18/2018    2003 nonprovoked and July 2016 RLE nonprovoked   • History of pulmonary embolus (PE) 6/18/2018   • Hypercoagulation syndrome (HCC) 6/18/2018   • Long term current use of anticoagulant 6/18/2018   • Prediabetes 6/18/2018   • Vitamin D deficiency 6/18/2018       Current Outpatient Medications   Medication Sig Dispense Refill   • famotidine (PEPCID) 20 MG Tab Take 20 mg by mouth every day.     • rivaroxaban (XARELTO) 20 MG Tab tablet Take 1 Tab by mouth with dinner. 90 Tab 3   • Cyanocobalamin (VITAMIN B-12) 5000 MCG SL Tab Place 5,000 mcg under tongue every day.     • Cholecalciferol (VITAMIN D) 2000 units Cap Take 1 Cap by mouth every day. 30 Cap      No current facility-administered medications for this visit.          No Known Allergies      Social History     Tobacco Use   • Smoking status: Never Smoker   • Smokeless tobacco: Never Used   Substance Use Topics   • Alcohol use: Yes     Comment: 5-6 mixed drinks a week   • Drug use: No       Past Surgical History:   Procedure Laterality Date   • APPENDECTOMY      Age 6        ROS    Ambulatory Vitals  /60 (BP Location: Left arm, Patient Position: Sitting, BP Cuff Size: Adult)   Pulse (!) 57   Temp 36.8 °C (98.3 °F) (Temporal)   Resp 14   Ht 1.778 m (5' 10\")   Wt 86.2 kg (190 lb)   SpO2 96%   BMI 27.26 kg/m²     Physical Exam   Constitutional: He is well-developed, well-nourished, and in no distress. No distress.   Cardiovascular: Normal rate, " regular rhythm and normal heart sounds. Exam reveals no gallop and no friction rub.   No murmur heard.  Pulmonary/Chest: Effort normal and breath sounds normal. He has no wheezes. He has no rales.   Genitourinary:    Genitourinary Comments: Prostate without nodule palpable.     Musculoskeletal:         General: No edema.   Neurological: He is alert. No cranial nerve deficit. Gait normal. Coordination normal.   Skin: He is not diaphoretic.       Component      Latest Ref Rng & Units 10/2/2019 10/9/2019 10/8/2020   WBC      4.8 - 10.8 K/uL 5.1  5.5   RBC      4.70 - 6.10 M/uL 4.56 (L)  4.68 (L)   Hemoglobin      14.0 - 18.0 g/dL 14.7  15.0   Hematocrit      42.0 - 52.0 % 44.5  45.3   MCV      81.4 - 97.8 fL 97.6  96.8   MCH      27.0 - 33.0 pg 32.2  32.1   MCHC      33.7 - 35.3 g/dL 33.0 (L)  33.1 (L)   RDW      35.9 - 50.0 fL 47.6  47.7   Platelet Count      164 - 446 K/uL 195  219   MPV      9.0 - 12.9 fL 10.2  10.2   Neutrophils-Polys      44.00 - 72.00 % 50.90  49.50   Lymphocytes      22.00 - 41.00 % 35.00  38.80   Monocytes      0.00 - 13.40 % 10.70  9.40   Eosinophils      0.00 - 6.90 % 2.00  1.30   Basophils      0.00 - 1.80 % 1.00  0.50   Immature Granulocytes      0.00 - 0.90 % 0.40  0.50   Nucleated RBC      /100 WBC 0.00  0.00   Neutrophils (Absolute)      1.82 - 7.42 K/uL 2.57  2.72   Lymphs (Absolute)      1.00 - 4.80 K/uL 1.77  2.14   Monos (Absolute)      0.00 - 0.85 K/uL 0.54  0.52   Eos (Absolute)      0.00 - 0.51 K/uL 0.10  0.07   Baso (Absolute)      0.00 - 0.12 K/uL 0.05  0.03   Immature Granulocytes (abs)      0.00 - 0.11 K/uL 0.02  0.03   NRBC (Absolute)      K/uL 0.00  0.00   Sodium      135 - 145 mmol/L 137  134 (L)   Potassium      3.6 - 5.5 mmol/L 4.3  4.1   Chloride      96 - 112 mmol/L 104  99   Co2      20 - 33 mmol/L 26  25   Anion Gap      7.0 - 16.0 7.0  10.0   Glucose      65 - 99 mg/dL 109 (H)  113 (H)   Bun      8 - 22 mg/dL 22  21   Creatinine      0.50 - 1.40 mg/dL 1.09  1.05    Calcium      8.5 - 10.5 mg/dL 9.6  9.4   AST(SGOT)      12 - 45 U/L 22  22   ALT(SGPT)      2 - 50 U/L 34  26   Alkaline Phosphatase      30 - 99 U/L 52  62   Total Bilirubin      0.1 - 1.5 mg/dL 0.7  0.7   Albumin      3.2 - 4.9 g/dL 4.6  5.0 (H)   Total Protein      6.0 - 8.2 g/dL 7.2  7.8   Globulin      1.9 - 3.5 g/dL 2.6  2.8   A-G Ratio      g/dL 1.8  1.8   Color       Yellow  Yellow   Character       Clear  Clear   Specific Gravity      <1.035 1.007  1.006   Ph      5.0 - 8.0 6.5  6.5   Glucose      Negative mg/dL Negative  Negative   Ketones      Negative mg/dL Negative  Negative   Protein      Negative mg/dL Negative  Negative   Bilirubin      Negative Negative  Negative   Urobilinogen, Urine      Negative 0.2  0.2   Nitrite      Negative Negative  Negative   Leukocyte Esterase      Negative Negative  Negative   Occult Blood      Negative Negative  Negative   Micro Urine Req       see below  see below   Cholesterol,Tot      100 - 199 mg/dL  196 196   Triglycerides      0 - 149 mg/dL  146 131   HDL      >=40 mg/dL  59 60   LDL Cholesterol      <=129 mg/dL  108    LDL Particle      <=1135 nmol/L  1269 (H)    Small LDL      <=634 nmol/L  518    L-VLDL Particle No.      <=2.7 nmol/L  1.6    HDL Particle No.      >=33.0 umol/L  >41.0    L-HDL Particle No.      >=4.2 umol/L  4.8    LDL Particle Size      >=20.7 nm  20.9    VLDL Size      <=46.7 nm  45.1    HDL Size      >=8.9 nm  8.6 (L)    EER LipoFit by NMR        See Note    LDL      <100 mg/dL   110 (H)   Testosterone,Total      300 - 720 ng/dL   407   Sex Hormone Bind Globulin      11 - 80 nmol/L   35   Free Testosterone      47 - 244 pg/mL   73   Testosterone % Free      1.6 - 2.9 %   1.8   Creatinine, Urine      mg/dL 16.00     Microalbumin, Urine Random      mg/dL <0.7     Micro Alb Creat Ratio      0 - 30 mg/g see below     Iron      50 - 180 ug/dL 138     Total Iron Binding      250 - 450 ug/dL 312     % Saturation      15 - 55 % 44      Glycohemoglobin      0.0 - 5.6 % 6.1 (H)  6.1 (H)   Estim. Avg Glu      mg/dL 128  128   GFR If African American      >60 mL/min/1.73 m 2 >60  >60   GFR If Non African American      >60 mL/min/1.73 m 2 >60  >60   Luteinizing Hormone      1.7 - 8.6 IU/L   6.9   Follicle Stimulating Hormone      1.5 - 12.4 mIU/mL   7.2   TSH      0.380 - 5.330 uIU/mL 2.110  2.500   Prostatic Specific Antigen Tot      0.00 - 4.00 ng/mL 2.96  3.83   25-Hydroxy   Vitamin D 25      30 - 100 ng/mL 38  51   Hepatitis C Antibody      Negative Negative     Vitamin B12 -True Cobalamin      211 - 911 pg/mL 289  3246 (H)   Homocysteine      <11.00 umol/L 9.75     Fasting Status        Fasting      Assessment and Plan:     1. Factor 5 Leiden mutation, heterozygous (HCC)     2. History of non anemic vitamin B12 deficiency  VITAMIN B12    CBC WITH DIFFERENTIAL    Comp Metabolic Panel   3. Prediabetes  HEMOGLOBIN A1C    Comp Metabolic Panel    URINALYSIS    Lipid Profile    TSH   4. History of vitamin D deficiency  VITAMIN D,25 HYDROXY   5. Prostate cancer screening  PROSTATE SPECIFIC AG SCREENING    URINALYSIS   6. Decreased energy  TESTOSTERONE F&T MALE ADULT    TSH     Continue xarelto for treatment of hypercoagulation disorder. Discussed may take his B supplement 3 days a week instead of daily since B12 level is good. Discussed if he develops a change in his BPH symptoms will check PSA sooner than one year. Check blood work in one year.  Face to face time: 25 minutes with greater than 50% of time spent with direct patient contact and medical management.     Prosper Duenas M.D.

## 2020-10-16 DIAGNOSIS — E78.00 HYPERCHOLESTEREMIA: ICD-10-CM

## 2020-10-16 DIAGNOSIS — R73.03 PREDIABETES: ICD-10-CM

## 2021-10-15 ENCOUNTER — HOSPITAL ENCOUNTER (OUTPATIENT)
Facility: MEDICAL CENTER | Age: 66
End: 2021-10-15
Attending: INTERNAL MEDICINE
Payer: MEDICARE

## 2021-10-15 ENCOUNTER — NON-PROVIDER VISIT (OUTPATIENT)
Dept: INTERNAL MEDICINE | Facility: IMAGING CENTER | Age: 66
End: 2021-10-15
Payer: MEDICARE

## 2021-10-15 DIAGNOSIS — Z01.89 ENCOUNTER FOR ROUTINE LABORATORY TESTING: ICD-10-CM

## 2021-10-15 DIAGNOSIS — R73.03 PREDIABETES: ICD-10-CM

## 2021-10-15 DIAGNOSIS — Z86.39 HISTORY OF VITAMIN D DEFICIENCY: ICD-10-CM

## 2021-10-15 DIAGNOSIS — K57.30 DIVERTICULOSIS OF COLON: ICD-10-CM

## 2021-10-15 DIAGNOSIS — E53.8 B12 DEFICIENCY: ICD-10-CM

## 2021-10-15 DIAGNOSIS — Z12.5 SCREENING FOR PROSTATE CANCER: ICD-10-CM

## 2021-10-15 DIAGNOSIS — N40.1 BPH ASSOCIATED WITH NOCTURIA: ICD-10-CM

## 2021-10-15 DIAGNOSIS — R53.83 DECREASED ENERGY: ICD-10-CM

## 2021-10-15 DIAGNOSIS — Z79.01 LONG TERM CURRENT USE OF ANTICOAGULANT: ICD-10-CM

## 2021-10-15 DIAGNOSIS — D68.59 HYPERCOAGULATION SYNDROME (HCC): ICD-10-CM

## 2021-10-15 DIAGNOSIS — E78.00 HYPERCHOLESTEREMIA: ICD-10-CM

## 2021-10-15 DIAGNOSIS — Z79.899 LONG TERM USE OF DRUG: ICD-10-CM

## 2021-10-15 DIAGNOSIS — R35.1 BPH ASSOCIATED WITH NOCTURIA: ICD-10-CM

## 2021-10-15 DIAGNOSIS — Z23 NEED FOR INFLUENZA VACCINATION: ICD-10-CM

## 2021-10-15 DIAGNOSIS — E55.9 VITAMIN D DEFICIENCY: ICD-10-CM

## 2021-10-15 DIAGNOSIS — Z86.39 HISTORY OF NON ANEMIC VITAMIN B12 DEFICIENCY: ICD-10-CM

## 2021-10-15 DIAGNOSIS — Z86.718 HISTORY OF DVT (DEEP VEIN THROMBOSIS): ICD-10-CM

## 2021-10-15 DIAGNOSIS — Z12.5 PROSTATE CANCER SCREENING: ICD-10-CM

## 2021-10-15 LAB
APPEARANCE UR: CLEAR
BILIRUB UR QL STRIP.AUTO: NEGATIVE
COLOR UR: YELLOW
GLUCOSE UR STRIP.AUTO-MCNC: NEGATIVE MG/DL
KETONES UR STRIP.AUTO-MCNC: NEGATIVE MG/DL
LEUKOCYTE ESTERASE UR QL STRIP.AUTO: NEGATIVE
MICRO URNS: NORMAL
NITRITE UR QL STRIP.AUTO: NEGATIVE
PH UR STRIP.AUTO: 5.5 [PH] (ref 5–8)
PROT UR QL STRIP: NEGATIVE MG/DL
RBC UR QL AUTO: NEGATIVE
SP GR UR STRIP.AUTO: 1
UROBILINOGEN UR STRIP.AUTO-MCNC: 0.2 MG/DL

## 2021-10-15 PROCEDURE — 83036 HEMOGLOBIN GLYCOSYLATED A1C: CPT

## 2021-10-15 PROCEDURE — 84443 ASSAY THYROID STIM HORMONE: CPT

## 2021-10-15 PROCEDURE — 82607 VITAMIN B-12: CPT

## 2021-10-15 PROCEDURE — 85025 COMPLETE CBC W/AUTO DIFF WBC: CPT

## 2021-10-15 PROCEDURE — 80053 COMPREHEN METABOLIC PANEL: CPT

## 2021-10-15 PROCEDURE — 84153 ASSAY OF PSA TOTAL: CPT

## 2021-10-15 PROCEDURE — 84403 ASSAY OF TOTAL TESTOSTERONE: CPT

## 2021-10-15 PROCEDURE — 90662 IIV NO PRSV INCREASED AG IM: CPT | Performed by: INTERNAL MEDICINE

## 2021-10-15 PROCEDURE — 82306 VITAMIN D 25 HYDROXY: CPT

## 2021-10-15 PROCEDURE — G0008 ADMIN INFLUENZA VIRUS VAC: HCPCS | Performed by: INTERNAL MEDICINE

## 2021-10-15 PROCEDURE — 80061 LIPID PANEL: CPT

## 2021-10-15 PROCEDURE — 84270 ASSAY OF SEX HORMONE GLOBUL: CPT

## 2021-10-15 PROCEDURE — 84402 ASSAY OF FREE TESTOSTERONE: CPT

## 2021-10-15 PROCEDURE — 81003 URINALYSIS AUTO W/O SCOPE: CPT

## 2021-10-16 LAB
25(OH)D3 SERPL-MCNC: 51 NG/ML (ref 30–100)
ALBUMIN SERPL BCP-MCNC: 5.1 G/DL (ref 3.2–4.9)
ALBUMIN/GLOB SERPL: 1.8 G/DL
ALP SERPL-CCNC: 63 U/L (ref 30–99)
ALT SERPL-CCNC: 27 U/L (ref 2–50)
ANION GAP SERPL CALC-SCNC: 12 MMOL/L (ref 7–16)
AST SERPL-CCNC: 27 U/L (ref 12–45)
BASOPHILS # BLD AUTO: 0.6 % (ref 0–1.8)
BASOPHILS # BLD: 0.04 K/UL (ref 0–0.12)
BILIRUB SERPL-MCNC: 0.7 MG/DL (ref 0.1–1.5)
BUN SERPL-MCNC: 19 MG/DL (ref 8–22)
CALCIUM SERPL-MCNC: 9.7 MG/DL (ref 8.5–10.5)
CHLORIDE SERPL-SCNC: 99 MMOL/L (ref 96–112)
CHOLEST SERPL-MCNC: 175 MG/DL (ref 100–199)
CO2 SERPL-SCNC: 24 MMOL/L (ref 20–33)
CREAT SERPL-MCNC: 1.24 MG/DL (ref 0.5–1.4)
EOSINOPHIL # BLD AUTO: 0.07 K/UL (ref 0–0.51)
EOSINOPHIL NFR BLD: 1.1 % (ref 0–6.9)
ERYTHROCYTE [DISTWIDTH] IN BLOOD BY AUTOMATED COUNT: 46.9 FL (ref 35.9–50)
EST. AVERAGE GLUCOSE BLD GHB EST-MCNC: 134 MG/DL
GLOBULIN SER CALC-MCNC: 2.9 G/DL (ref 1.9–3.5)
GLUCOSE SERPL-MCNC: 108 MG/DL (ref 65–99)
HBA1C MFR BLD: 6.3 % (ref 4–5.6)
HCT VFR BLD AUTO: 45.8 % (ref 42–52)
HDLC SERPL-MCNC: 53 MG/DL
HGB BLD-MCNC: 15.2 G/DL (ref 14–18)
IMM GRANULOCYTES # BLD AUTO: 0.02 K/UL (ref 0–0.11)
IMM GRANULOCYTES NFR BLD AUTO: 0.3 % (ref 0–0.9)
LDLC SERPL CALC-MCNC: 98 MG/DL
LYMPHOCYTES # BLD AUTO: 2.16 K/UL (ref 1–4.8)
LYMPHOCYTES NFR BLD: 34.6 % (ref 22–41)
MCH RBC QN AUTO: 31.7 PG (ref 27–33)
MCHC RBC AUTO-ENTMCNC: 33.2 G/DL (ref 33.7–35.3)
MCV RBC AUTO: 95.4 FL (ref 81.4–97.8)
MONOCYTES # BLD AUTO: 0.58 K/UL (ref 0–0.85)
MONOCYTES NFR BLD AUTO: 9.3 % (ref 0–13.4)
NEUTROPHILS # BLD AUTO: 3.38 K/UL (ref 1.82–7.42)
NEUTROPHILS NFR BLD: 54.1 % (ref 44–72)
NRBC # BLD AUTO: 0 K/UL
NRBC BLD-RTO: 0 /100 WBC
PLATELET # BLD AUTO: 223 K/UL (ref 164–446)
PMV BLD AUTO: 10.7 FL (ref 9–12.9)
POTASSIUM SERPL-SCNC: 4.3 MMOL/L (ref 3.6–5.5)
PROT SERPL-MCNC: 8 G/DL (ref 6–8.2)
PSA SERPL-MCNC: 1.61 NG/ML (ref 0–4)
RBC # BLD AUTO: 4.8 M/UL (ref 4.7–6.1)
SODIUM SERPL-SCNC: 135 MMOL/L (ref 135–145)
TRIGL SERPL-MCNC: 122 MG/DL (ref 0–149)
TSH SERPL DL<=0.005 MIU/L-ACNC: 2.3 UIU/ML (ref 0.38–5.33)
VIT B12 SERPL-MCNC: 1058 PG/ML (ref 211–911)
WBC # BLD AUTO: 6.3 K/UL (ref 4.8–10.8)

## 2021-10-18 LAB
SHBG SERPL-SCNC: 36 NMOL/L (ref 11–80)
TESTOST FREE MFR SERPL: 1.7 % (ref 1.6–2.9)
TESTOST FREE SERPL-MCNC: 51 PG/ML (ref 47–244)
TESTOST SERPL-MCNC: 299 NG/DL (ref 300–720)

## 2021-11-03 ENCOUNTER — OFFICE VISIT (OUTPATIENT)
Dept: INTERNAL MEDICINE | Facility: IMAGING CENTER | Age: 66
End: 2021-11-03
Payer: MEDICARE

## 2021-11-03 VITALS
DIASTOLIC BLOOD PRESSURE: 60 MMHG | WEIGHT: 190 LBS | SYSTOLIC BLOOD PRESSURE: 110 MMHG | HEART RATE: 56 BPM | RESPIRATION RATE: 14 BRPM | OXYGEN SATURATION: 99 % | TEMPERATURE: 98 F | BODY MASS INDEX: 27.2 KG/M2 | HEIGHT: 70 IN

## 2021-11-03 DIAGNOSIS — R73.03 PREDIABETES: ICD-10-CM

## 2021-11-03 DIAGNOSIS — Z86.39 HISTORY OF VITAMIN D DEFICIENCY: ICD-10-CM

## 2021-11-03 DIAGNOSIS — Z86.39 HISTORY OF NON ANEMIC VITAMIN B12 DEFICIENCY: ICD-10-CM

## 2021-11-03 DIAGNOSIS — E29.1 ANDROGEN DEFICIENCY: ICD-10-CM

## 2021-11-03 DIAGNOSIS — Z00.00 MEDICARE ANNUAL WELLNESS VISIT, SUBSEQUENT: ICD-10-CM

## 2021-11-03 DIAGNOSIS — Z12.5 PROSTATE CANCER SCREENING: ICD-10-CM

## 2021-11-03 DIAGNOSIS — Z79.899 LONG TERM USE OF DRUG: ICD-10-CM

## 2021-11-03 DIAGNOSIS — D68.51 FACTOR 5 LEIDEN MUTATION, HETEROZYGOUS (HCC): ICD-10-CM

## 2021-11-03 PROCEDURE — G0439 PPPS, SUBSEQ VISIT: HCPCS | Performed by: INTERNAL MEDICINE

## 2021-11-03 ASSESSMENT — ACTIVITIES OF DAILY LIVING (ADL): BATHING_REQUIRES_ASSISTANCE: 0

## 2021-11-03 ASSESSMENT — ENCOUNTER SYMPTOMS: GENERAL WELL-BEING: GOOD

## 2021-11-03 ASSESSMENT — PATIENT HEALTH QUESTIONNAIRE - PHQ9: CLINICAL INTERPRETATION OF PHQ2 SCORE: 0

## 2021-11-03 ASSESSMENT — FIBROSIS 4 INDEX: FIB4 SCORE: 1.58

## 2021-11-03 NOTE — PROGRESS NOTES
Established Patient Note   HPI:        Mehul comes in today for annual medicare wellness. He has done recent lab work.    Past Medical History:   Diagnosis Date   • B12 deficiency 11/8/2019   • BPH associated with nocturia 11/8/2019   • Diverticulosis of colon 11/9/2019    Colonoscopy Dec. 2014: Sigmoid diverticulosis mild   • Factor 5 Leiden mutation, heterozygous (HCC) 6/18/2018   • Family history of colon cancer 6/18/2018   • History of DVT (deep vein thrombosis) 6/18/2018    2003 nonprovoked and July 2016 RLE nonprovoked   • History of pulmonary embolus (PE) 6/18/2018   • Hypercoagulation syndrome (HCC) 6/18/2018   • Long term current use of anticoagulant 6/18/2018   • Prediabetes 6/18/2018   • Vitamin D deficiency 6/18/2018       Current Outpatient Medications   Medication Sig Dispense Refill   • famotidine (PEPCID) 20 MG Tab Take 20 mg by mouth every day.     • rivaroxaban (XARELTO) 20 MG Tab tablet Take 1 Tab by mouth with dinner. 90 Tab 3   • Cyanocobalamin (VITAMIN B-12) 5000 MCG SL Tab Place 5,000 mcg under tongue every day.     • Cholecalciferol (VITAMIN D) 2000 units Cap Take 1 Cap by mouth every day. 30 Cap      No current facility-administered medications for this visit.         No Known Allergies      Social History     Tobacco Use   • Smoking status: Never Smoker   • Smokeless tobacco: Never Used   Substance Use Topics   • Alcohol use: Yes     Comment: 5-6 mixed drinks a week   • Drug use: No       Past Surgical History:   Procedure Laterality Date   • APPENDECTOMY      Age 6        ROS  Depression Screening    Little interest or pleasure in doing things?  0 - not at all  Feeling down, depressed , or hopeless? 0 - not at all  Patient Health Questionnaire Score: 0     If depressive symptoms identified deferred to follow up visit unless specifically addressed in assessment and plan.    Interpretation of PHQ-9 Total Score   Score Severity   1-4 No Depression   5-9 Mild Depression   10-14 Moderate  Depression   15-19 Moderately Severe Depression   20-27 Severe Depression    Screening for Cognitive Impairment    Three Minute Recall (captain, garden, picture) 3/3    Shamir clock face with all 12 numbers and set the hands to show 5 past 8.  Yes    Cognitive concerns identified deferred for follow up unless specifically addressed in assessment and plan.    Fall Risk Assessment    Has the patient had two or more falls in the last year or any fall with injury in the last year?  No    Safety Assessment    Throw rugs on floor.  No  Handrails on all stairs.  Yes  Good lighting in all hallways.  Yes  Difficulty hearing.  No  Patient counseled about all safety risks that were identified.    Functional Assessment ADLs    Are there any barriers preventing you from cooking for yourself or meeting nutritional needs?  No.    Are there any barriers preventing you from driving safely or obtaining transportation?  No.    Are there any barriers preventing you from using a telephone or calling for help?  No.    Are there any barriers preventing you from shopping?  No.    Are there any barriers preventing you from taking care of your own finances?  No.    Are there any barriers preventing you from managing your medications?  No.    Are there any barriers preventing you from showering, bathing or dressing yourself?  No.    Are you currently engaging in any exercise or physical activity?  Yes.     What is your perception of your health?  Good.      Health Maintenance Summary                RETINAL SCREENING Postponed 11/3/2022 Originally 1/29/2021. Provider advises postponing for medical reasons     Done 1/29/2020 REFERRAL FOR RETINAL SCREENING EXAM    URINE ACR / MICROALBUMIN Postponed 11/3/2022 Originally 10/2/2020. Provider advises postponing for medical reasons     Done 10/2/2019 MICROALBUMIN CREAT RATIO URINE     Patient has more history with this topic...    A1C SCREENING Next Due 4/15/2022      Done 10/15/2021 HEMOGLOBIN A1C      "Patient has more history with this topic...    FASTING LIPID PROFILE Next Due 10/15/2022      Done 10/15/2021 LIPID PROFILE     Patient has more history with this topic...    SERUM CREATININE Next Due 10/15/2022      Done 10/15/2021 COMP METABOLIC PANEL     Patient has more history with this topic...    Annual Wellness Visit Next Due 11/4/2022      Done 11/3/2021 Visit Dx: Medicare annual wellness visit, subsequent     Patient has more history with this topic...    IMM DTaP/Tdap/Td Vaccine Next Due 10/1/2024      Done 10/1/2014 Imm Admin: Tdap Vaccine    COLORECTAL CANCER SCREENING Next Due 12/8/2024           Patient Care Team:  Prosper Duenas M.D. as PCP - General (Internal Medicine)     Ambulatory Vitals  /60 (BP Location: Left arm, Patient Position: Sitting, BP Cuff Size: Adult)   Pulse (!) 56   Temp 36.7 °C (98 °F) (Temporal)   Resp 14   Ht 1.778 m (5' 10\")   Wt 86.2 kg (190 lb)   SpO2 99%   BMI 27.26 kg/m²     Physical Exam  Constitutional:       Appearance: Normal appearance.   Cardiovascular:      Rate and Rhythm: Normal rate and regular rhythm.      Heart sounds: Normal heart sounds. No murmur heard.   No friction rub. No gallop.       Comments: No carotid bruits bilaterally.  Pulmonary:      Effort: Pulmonary effort is normal.      Breath sounds: Normal breath sounds. No wheezing or rales.   Abdominal:      General: There is no distension.      Palpations: Abdomen is soft. There is no mass.      Tenderness: There is no abdominal tenderness. There is no right CVA tenderness or left CVA tenderness.   Genitourinary:     Prostate: Normal.   Musculoskeletal:      Cervical back: No tenderness.      Right lower leg: No edema.      Left lower leg: No edema.   Lymphadenopathy:      Cervical: No cervical adenopathy.   Neurological:      General: No focal deficit present.      Coordination: Coordination normal.      Gait: Gait normal.         Component      Latest Ref Rng & Units 10/8/2020 10/15/2021 "   WBC      4.8 - 10.8 K/uL 5.5 6.3   RBC      4.70 - 6.10 M/uL 4.68 (L) 4.80   Hemoglobin      14.0 - 18.0 g/dL 15.0 15.2   Hematocrit      42.0 - 52.0 % 45.3 45.8   MCV      81.4 - 97.8 fL 96.8 95.4   MCH      27.0 - 33.0 pg 32.1 31.7   MCHC      33.7 - 35.3 g/dL 33.1 (L) 33.2 (L)   RDW      35.9 - 50.0 fL 47.7 46.9   Platelet Count      164 - 446 K/uL 219 223   MPV      9.0 - 12.9 fL 10.2 10.7   Neutrophils-Polys      44.00 - 72.00 % 49.50 54.10   Lymphocytes      22.00 - 41.00 % 38.80 34.60   Monocytes      0.00 - 13.40 % 9.40 9.30   Eosinophils      0.00 - 6.90 % 1.30 1.10   Basophils      0.00 - 1.80 % 0.50 0.60   Immature Granulocytes      0.00 - 0.90 % 0.50 0.30   Nucleated RBC      /100 WBC 0.00 0.00   Neutrophils (Absolute)      1.82 - 7.42 K/uL 2.72 3.38   Lymphs (Absolute)      1.00 - 4.80 K/uL 2.14 2.16   Monos (Absolute)      0.00 - 0.85 K/uL 0.52 0.58   Eos (Absolute)      0.00 - 0.51 K/uL 0.07 0.07   Baso (Absolute)      0.00 - 0.12 K/uL 0.03 0.04   Immature Granulocytes (abs)      0.00 - 0.11 K/uL 0.03 0.02   NRBC (Absolute)      K/uL 0.00 0.00   Sodium      135 - 145 mmol/L 134 (L) 135   Potassium      3.6 - 5.5 mmol/L 4.1 4.3   Chloride      96 - 112 mmol/L 99 99   Co2      20 - 33 mmol/L 25 24   Anion Gap      7.0 - 16.0 10.0 12.0   Glucose      65 - 99 mg/dL 113 (H) 108 (H)   Bun      8 - 22 mg/dL 21 19   Creatinine      0.50 - 1.40 mg/dL 1.05 1.24   Calcium      8.5 - 10.5 mg/dL 9.4 9.7   AST(SGOT)      12 - 45 U/L 22 27   ALT(SGPT)      2 - 50 U/L 26 27   Alkaline Phosphatase      30 - 99 U/L 62 63   Total Bilirubin      0.1 - 1.5 mg/dL 0.7 0.7   Albumin      3.2 - 4.9 g/dL 5.0 (H) 5.1 (H)   Total Protein      6.0 - 8.2 g/dL 7.8 8.0   Globulin      1.9 - 3.5 g/dL 2.8 2.9   A-G Ratio      g/dL 1.8 1.8   Color       Yellow Yellow   Character       Clear Clear   Specific Gravity      <1.035 1.006 1.004   Ph      5.0 - 8.0 6.5 5.5   Glucose      Negative mg/dL Negative Negative   Ketones       Negative mg/dL Negative Negative   Protein      Negative mg/dL Negative Negative   Bilirubin      Negative Negative Negative   Urobilinogen, Urine      Negative 0.2 0.2   Nitrite      Negative Negative Negative   Leukocyte Esterase      Negative Negative Negative   Occult Blood      Negative Negative Negative   Micro Urine Req       see below see below   Cholesterol,Tot      100 - 199 mg/dL 196 175   Triglycerides      0 - 149 mg/dL 131 122   HDL      >=40 mg/dL 60 53   LDL      <100 mg/dL 110 (H) 98   Testosterone,Total      300 - 720 ng/dL 407 299 (L)   Sex Hormone Bind Globulin      11 - 80 nmol/L 35 36   Free Testosterone      47 - 244 pg/mL 73 51   Testosterone % Free      1.6 - 2.9 % 1.8 1.7   Luteinizing Hormone      1.7 - 8.6 IU/L 6.9    Follicle Stimulating Hormone      1.5 - 12.4 mIU/mL 7.2    Glycohemoglobin      4.0 - 5.6 % 6.1 (H) 6.3 (H)   Estim. Avg Glu      mg/dL 128 134   GFR If African American      >60 mL/min/1.73 m 2 >60 >60   GFR If Non African American      >60 mL/min/1.73 m 2 >60 58 (A)   Vitamin B12 -True Cobalamin      211 - 911 pg/mL 3246 (H) 1058 (H)   Prostatic Specific Antigen Tot      0.00 - 4.00 ng/mL 3.83 1.61   25-Hydroxy   Vitamin D 25      30 - 100 ng/mL 51 51   TSH      0.380 - 5.330 uIU/mL 2.500 2.300     Assessment and Plan:     1. Medicare annual wellness visit, subsequent     2. Prediabetes     3. Factor 5 Leiden mutation, heterozygous (HCC)     4. History of non anemic vitamin B12 deficiency     5. History of vitamin D deficiency       Discussed weight reduction to lower BMI and abdominal girth to help lower HbA1c; also discussed could start low dose metformin or rybelsus to help with his prediabetes. Discussed could check CMP and HbA1c in 6 months; he will call if he decides to do blood work sooner than one year. Check CBC, CMP, free and total testosterone, HbA1c, lipid panel, B12, vitamin D, TSH, PSA, urinalysis in one year.    Prosper Duenas M.D.

## 2022-01-06 ENCOUNTER — HOSPITAL ENCOUNTER (OUTPATIENT)
Facility: MEDICAL CENTER | Age: 67
End: 2022-01-06
Attending: INTERNAL MEDICINE
Payer: MEDICARE

## 2022-01-06 ENCOUNTER — NON-PROVIDER VISIT (OUTPATIENT)
Dept: INTERNAL MEDICINE | Facility: IMAGING CENTER | Age: 67
End: 2022-01-06
Payer: COMMERCIAL

## 2022-01-06 DIAGNOSIS — Z20.822 CLOSE EXPOSURE TO COVID-19 VIRUS: ICD-10-CM

## 2022-01-06 PROCEDURE — U0005 INFEC AGEN DETEC AMPLI PROBE: HCPCS

## 2022-01-06 PROCEDURE — U0003 INFECTIOUS AGENT DETECTION BY NUCLEIC ACID (DNA OR RNA); SEVERE ACUTE RESPIRATORY SYNDROME CORONAVIRUS 2 (SARS-COV-2) (CORONAVIRUS DISEASE [COVID-19]), AMPLIFIED PROBE TECHNIQUE, MAKING USE OF HIGH THROUGHPUT TECHNOLOGIES AS DESCRIBED BY CMS-2020-01-R: HCPCS

## 2022-01-07 DIAGNOSIS — Z20.822 CLOSE EXPOSURE TO COVID-19 VIRUS: ICD-10-CM

## 2022-01-07 LAB — COVID ORDER STATUS COVID19: NORMAL

## 2022-01-08 LAB
SARS-COV-2 RNA RESP QL NAA+PROBE: NOTDETECTED
SPECIMEN SOURCE: NORMAL

## 2022-01-21 ENCOUNTER — HOSPITAL ENCOUNTER (OUTPATIENT)
Facility: MEDICAL CENTER | Age: 67
End: 2022-01-21
Attending: INTERNAL MEDICINE
Payer: MEDICARE

## 2022-01-21 ENCOUNTER — NON-PROVIDER VISIT (OUTPATIENT)
Dept: INTERNAL MEDICINE | Facility: IMAGING CENTER | Age: 67
End: 2022-01-21
Payer: COMMERCIAL

## 2022-01-21 DIAGNOSIS — Z20.822 CLOSE EXPOSURE TO COVID-19 VIRUS: ICD-10-CM

## 2022-01-21 PROCEDURE — U0005 INFEC AGEN DETEC AMPLI PROBE: HCPCS

## 2022-01-21 PROCEDURE — U0003 INFECTIOUS AGENT DETECTION BY NUCLEIC ACID (DNA OR RNA); SEVERE ACUTE RESPIRATORY SYNDROME CORONAVIRUS 2 (SARS-COV-2) (CORONAVIRUS DISEASE [COVID-19]), AMPLIFIED PROBE TECHNIQUE, MAKING USE OF HIGH THROUGHPUT TECHNOLOGIES AS DESCRIBED BY CMS-2020-01-R: HCPCS

## 2022-01-22 LAB — COVID ORDER STATUS COVID19: NORMAL

## 2022-01-23 LAB
SARS-COV-2 RNA RESP QL NAA+PROBE: NOTDETECTED
SPECIMEN SOURCE: NORMAL

## 2022-06-23 DIAGNOSIS — Z86.718 HISTORY OF DVT (DEEP VEIN THROMBOSIS): ICD-10-CM

## 2022-06-23 DIAGNOSIS — D68.51 FACTOR 5 LEIDEN MUTATION, HETEROZYGOUS (HCC): ICD-10-CM

## 2022-06-23 DIAGNOSIS — Z86.711 HISTORY OF PULMONARY EMBOLUS (PE): ICD-10-CM

## 2022-07-08 ENCOUNTER — TELEPHONE (OUTPATIENT)
Dept: INTERNAL MEDICINE | Facility: IMAGING CENTER | Age: 67
End: 2022-07-08
Payer: COMMERCIAL

## 2022-07-08 NOTE — TELEPHONE ENCOUNTER
Mehul tested positive for COVID on a home test. His symptoms started yesterday. He has a fever of 101.6, headache, mild congestion, and joint pain.

## 2022-07-08 NOTE — TELEPHONE ENCOUNTER
Mehul tested positive for covid at home. He has fever of 101 degrees. Will Rx paxlovid. Since he take xarelto for his factor V mutation and history of DVT he has been advised to cut xarelto in half or take one every other day since the paxlovid can affect activity of xarelto.

## 2022-08-31 ENCOUNTER — TELEPHONE (OUTPATIENT)
Dept: INTERNAL MEDICINE | Facility: IMAGING CENTER | Age: 67
End: 2022-08-31
Payer: COMMERCIAL

## 2022-08-31 ENCOUNTER — HOSPITAL ENCOUNTER (OUTPATIENT)
Dept: RADIOLOGY | Facility: MEDICAL CENTER | Age: 67
End: 2022-08-31
Attending: INTERNAL MEDICINE
Payer: MEDICARE

## 2022-08-31 DIAGNOSIS — D68.51 FACTOR 5 LEIDEN MUTATION, HETEROZYGOUS (HCC): ICD-10-CM

## 2022-08-31 DIAGNOSIS — M79.662 PAIN OF LEFT CALF: ICD-10-CM

## 2022-08-31 DIAGNOSIS — Z86.711 HISTORY OF PULMONARY EMBOLUS (PE): ICD-10-CM

## 2022-08-31 DIAGNOSIS — Z86.718 HISTORY OF DVT (DEEP VEIN THROMBOSIS): ICD-10-CM

## 2022-08-31 PROCEDURE — 93971 EXTREMITY STUDY: CPT | Mod: LT

## 2022-10-21 ENCOUNTER — HOSPITAL ENCOUNTER (OUTPATIENT)
Facility: MEDICAL CENTER | Age: 67
End: 2022-10-21
Attending: INTERNAL MEDICINE
Payer: MEDICARE

## 2022-10-21 ENCOUNTER — NON-PROVIDER VISIT (OUTPATIENT)
Dept: INTERNAL MEDICINE | Facility: IMAGING CENTER | Age: 67
End: 2022-10-21
Payer: MEDICARE

## 2022-10-21 DIAGNOSIS — Z86.39 HISTORY OF VITAMIN D DEFICIENCY: ICD-10-CM

## 2022-10-21 DIAGNOSIS — E29.1 ANDROGEN DEFICIENCY: ICD-10-CM

## 2022-10-21 DIAGNOSIS — Z01.89 ENCOUNTER FOR ROUTINE LABORATORY TESTING: ICD-10-CM

## 2022-10-21 DIAGNOSIS — R73.03 PREDIABETES: ICD-10-CM

## 2022-10-21 DIAGNOSIS — Z86.39 HISTORY OF NON ANEMIC VITAMIN B12 DEFICIENCY: ICD-10-CM

## 2022-10-21 DIAGNOSIS — Z12.5 PROSTATE CANCER SCREENING: ICD-10-CM

## 2022-10-21 DIAGNOSIS — Z79.899 LONG TERM USE OF DRUG: ICD-10-CM

## 2022-10-21 DIAGNOSIS — Z23 NEED FOR INFLUENZA VACCINATION: ICD-10-CM

## 2022-10-21 LAB
25(OH)D3 SERPL-MCNC: 42 NG/ML (ref 30–100)
ALBUMIN SERPL BCP-MCNC: 4.7 G/DL (ref 3.2–4.9)
ALBUMIN/GLOB SERPL: 1.6 G/DL
ALP SERPL-CCNC: 72 U/L (ref 30–99)
ALT SERPL-CCNC: 19 U/L (ref 2–50)
ANION GAP SERPL CALC-SCNC: 11 MMOL/L (ref 7–16)
APPEARANCE UR: CLEAR
AST SERPL-CCNC: 19 U/L (ref 12–45)
BASOPHILS # BLD AUTO: 1.1 % (ref 0–1.8)
BASOPHILS # BLD: 0.09 K/UL (ref 0–0.12)
BILIRUB SERPL-MCNC: 0.4 MG/DL (ref 0.1–1.5)
BILIRUB UR QL STRIP.AUTO: NEGATIVE
BUN SERPL-MCNC: 21 MG/DL (ref 8–22)
CALCIUM SERPL-MCNC: 9.4 MG/DL (ref 8.5–10.5)
CHLORIDE SERPL-SCNC: 99 MMOL/L (ref 96–112)
CHOLEST SERPL-MCNC: 182 MG/DL (ref 100–199)
CO2 SERPL-SCNC: 25 MMOL/L (ref 20–33)
COLOR UR: YELLOW
CREAT SERPL-MCNC: 1.23 MG/DL (ref 0.5–1.4)
EOSINOPHIL # BLD AUTO: 0.12 K/UL (ref 0–0.51)
EOSINOPHIL NFR BLD: 1.4 % (ref 0–6.9)
ERYTHROCYTE [DISTWIDTH] IN BLOOD BY AUTOMATED COUNT: 46.5 FL (ref 35.9–50)
EST. AVERAGE GLUCOSE BLD GHB EST-MCNC: 134 MG/DL
GFR SERPLBLD CREATININE-BSD FMLA CKD-EPI: 63 ML/MIN/1.73 M 2
GLOBULIN SER CALC-MCNC: 2.9 G/DL (ref 1.9–3.5)
GLUCOSE SERPL-MCNC: 119 MG/DL (ref 65–99)
GLUCOSE UR STRIP.AUTO-MCNC: NEGATIVE MG/DL
HBA1C MFR BLD: 6.3 % (ref 4–5.6)
HCT VFR BLD AUTO: 44.2 % (ref 42–52)
HDLC SERPL-MCNC: 52 MG/DL
HGB BLD-MCNC: 14.5 G/DL (ref 14–18)
IMM GRANULOCYTES # BLD AUTO: 0.15 K/UL (ref 0–0.11)
IMM GRANULOCYTES NFR BLD AUTO: 1.8 % (ref 0–0.9)
KETONES UR STRIP.AUTO-MCNC: NEGATIVE MG/DL
LDLC SERPL CALC-MCNC: 108 MG/DL
LEUKOCYTE ESTERASE UR QL STRIP.AUTO: NEGATIVE
LYMPHOCYTES # BLD AUTO: 2.01 K/UL (ref 1–4.8)
LYMPHOCYTES NFR BLD: 23.7 % (ref 22–41)
MCH RBC QN AUTO: 31.6 PG (ref 27–33)
MCHC RBC AUTO-ENTMCNC: 32.8 G/DL (ref 33.7–35.3)
MCV RBC AUTO: 96.3 FL (ref 81.4–97.8)
MICRO URNS: NORMAL
MONOCYTES # BLD AUTO: 0.77 K/UL (ref 0–0.85)
MONOCYTES NFR BLD AUTO: 9.1 % (ref 0–13.4)
NEUTROPHILS # BLD AUTO: 5.35 K/UL (ref 1.82–7.42)
NEUTROPHILS NFR BLD: 62.9 % (ref 44–72)
NITRITE UR QL STRIP.AUTO: NEGATIVE
NRBC # BLD AUTO: 0 K/UL
NRBC BLD-RTO: 0 /100 WBC
PH UR STRIP.AUTO: 5.5 [PH] (ref 5–8)
PLATELET # BLD AUTO: 264 K/UL (ref 164–446)
PMV BLD AUTO: 10 FL (ref 9–12.9)
POTASSIUM SERPL-SCNC: 4.1 MMOL/L (ref 3.6–5.5)
PROT SERPL-MCNC: 7.6 G/DL (ref 6–8.2)
PROT UR QL STRIP: NEGATIVE MG/DL
PSA SERPL-MCNC: 1.57 NG/ML (ref 0–4)
RBC # BLD AUTO: 4.59 M/UL (ref 4.7–6.1)
RBC UR QL AUTO: NEGATIVE
SODIUM SERPL-SCNC: 135 MMOL/L (ref 135–145)
SP GR UR STRIP.AUTO: 1
TRIGL SERPL-MCNC: 108 MG/DL (ref 0–149)
TSH SERPL DL<=0.005 MIU/L-ACNC: 1.5 UIU/ML (ref 0.38–5.33)
UROBILINOGEN UR STRIP.AUTO-MCNC: 0.2 MG/DL
VIT B12 SERPL-MCNC: 936 PG/ML (ref 211–911)
WBC # BLD AUTO: 8.5 K/UL (ref 4.8–10.8)

## 2022-10-21 PROCEDURE — 80061 LIPID PANEL: CPT

## 2022-10-21 PROCEDURE — 84153 ASSAY OF PSA TOTAL: CPT

## 2022-10-21 PROCEDURE — G0008 ADMIN INFLUENZA VIRUS VAC: HCPCS | Performed by: INTERNAL MEDICINE

## 2022-10-21 PROCEDURE — 80053 COMPREHEN METABOLIC PANEL: CPT

## 2022-10-21 PROCEDURE — 84402 ASSAY OF FREE TESTOSTERONE: CPT

## 2022-10-21 PROCEDURE — 82607 VITAMIN B-12: CPT

## 2022-10-21 PROCEDURE — 84403 ASSAY OF TOTAL TESTOSTERONE: CPT

## 2022-10-21 PROCEDURE — 84443 ASSAY THYROID STIM HORMONE: CPT

## 2022-10-21 PROCEDURE — 83036 HEMOGLOBIN GLYCOSYLATED A1C: CPT

## 2022-10-21 PROCEDURE — 90662 IIV NO PRSV INCREASED AG IM: CPT | Performed by: INTERNAL MEDICINE

## 2022-10-21 PROCEDURE — 81003 URINALYSIS AUTO W/O SCOPE: CPT

## 2022-10-21 PROCEDURE — 85025 COMPLETE CBC W/AUTO DIFF WBC: CPT

## 2022-10-21 PROCEDURE — 82306 VITAMIN D 25 HYDROXY: CPT

## 2022-10-21 PROCEDURE — 84270 ASSAY OF SEX HORMONE GLOBUL: CPT

## 2022-10-24 LAB
SHBG SERPL-SCNC: 26 NMOL/L (ref 19–76)
TESTOST FREE MFR SERPL: 2.1 % (ref 1.6–2.9)
TESTOST FREE SERPL-MCNC: 72 PG/ML (ref 47–244)
TESTOST SERPL-MCNC: 349 NG/DL (ref 300–720)

## 2022-11-08 ENCOUNTER — PATIENT MESSAGE (OUTPATIENT)
Dept: HEALTH INFORMATION MANAGEMENT | Facility: OTHER | Age: 67
End: 2022-11-08

## 2022-11-09 ENCOUNTER — OFFICE VISIT (OUTPATIENT)
Dept: INTERNAL MEDICINE | Facility: IMAGING CENTER | Age: 67
End: 2022-11-09
Payer: MEDICARE

## 2022-11-09 ENCOUNTER — APPOINTMENT (OUTPATIENT)
Dept: INTERNAL MEDICINE | Facility: IMAGING CENTER | Age: 67
End: 2022-11-09
Payer: MEDICARE

## 2022-11-09 VITALS
HEIGHT: 70 IN | HEART RATE: 65 BPM | SYSTOLIC BLOOD PRESSURE: 118 MMHG | TEMPERATURE: 97.5 F | BODY MASS INDEX: 27.2 KG/M2 | RESPIRATION RATE: 14 BRPM | WEIGHT: 190 LBS | DIASTOLIC BLOOD PRESSURE: 68 MMHG | OXYGEN SATURATION: 99 %

## 2022-11-09 DIAGNOSIS — Z86.718 HISTORY OF DVT (DEEP VEIN THROMBOSIS): ICD-10-CM

## 2022-11-09 DIAGNOSIS — D72.828 GRANULOCYTOSIS: ICD-10-CM

## 2022-11-09 DIAGNOSIS — D68.51 FACTOR 5 LEIDEN MUTATION, HETEROZYGOUS (HCC): ICD-10-CM

## 2022-11-09 DIAGNOSIS — Z00.00 MEDICARE ANNUAL WELLNESS VISIT, SUBSEQUENT: ICD-10-CM

## 2022-11-09 DIAGNOSIS — R73.03 PREDIABETES: ICD-10-CM

## 2022-11-09 PROCEDURE — G0439 PPPS, SUBSEQ VISIT: HCPCS | Performed by: INTERNAL MEDICINE

## 2022-11-09 ASSESSMENT — ACTIVITIES OF DAILY LIVING (ADL): BATHING_REQUIRES_ASSISTANCE: 0

## 2022-11-09 ASSESSMENT — FIBROSIS 4 INDEX: FIB4 SCORE: 1.14

## 2022-11-09 ASSESSMENT — ENCOUNTER SYMPTOMS: GENERAL WELL-BEING: GOOD

## 2022-11-09 ASSESSMENT — PATIENT HEALTH QUESTIONNAIRE - PHQ9: CLINICAL INTERPRETATION OF PHQ2 SCORE: 0

## 2022-11-09 NOTE — PROGRESS NOTES
Established Patient Note   HPI:        Mehul returns today for annual medicare wellness and to review recent lab work. He had covid in July treated with paxlovid but had rebound covid. He has seen ENT Dr Peoples who has recommended surgical intervention for sinus trouble.    Past Medical History:   Diagnosis Date    B12 deficiency 11/8/2019    BPH associated with nocturia 11/8/2019    Diverticulosis of colon 11/9/2019    Colonoscopy Dec. 2014: Sigmoid diverticulosis mild    Factor 5 Leiden mutation, heterozygous (HCC) 6/18/2018    Family history of colon cancer 6/18/2018    History of DVT (deep vein thrombosis) 6/18/2018    2003 nonprovoked and July 2016 RLE nonprovoked    History of pulmonary embolus (PE) 6/18/2018    Hypercoagulation syndrome (HCC) 6/18/2018    Long term current use of anticoagulant 6/18/2018    Prediabetes 6/18/2018    Vitamin D deficiency 6/18/2018       Current Outpatient Medications   Medication Sig Dispense Refill    rivaroxaban (XARELTO) 20 MG Tab tablet Take 1 Tablet by mouth with dinner. 90 Tablet 3    famotidine (PEPCID) 20 MG Tab Take 1 Tablet by mouth every day.      Cyanocobalamin (VITAMIN B-12) 5000 MCG SL Tab Place 5,000 mcg under tongue every day.      Cholecalciferol (VITAMIN D) 2000 units Cap Take 1 Capsule by mouth every day. 30 Cap      No current facility-administered medications for this visit.         No Known Allergies      Social History     Tobacco Use    Smoking status: Never    Smokeless tobacco: Never   Substance Use Topics    Alcohol use: Yes     Comment: 5-6 mixed drinks a week    Drug use: No       Past Surgical History:   Procedure Laterality Date    APPENDECTOMY      Age 6        ROS  Depression Screening  Little interest or pleasure in doing things?  0 - not at all  Feeling down, depressed , or hopeless? 0 - not at all  Patient Health Questionnaire Score: 0     If depressive symptoms identified deferred to follow up visit unless specifically addressed in assessment  and plan.    Interpretation of PHQ-9 Total Score   Score Severity   1-4 No Depression   5-9 Mild Depression   10-14 Moderate Depression   15-19 Moderately Severe Depression   20-27 Severe Depression    Screening for Cognitive Impairment  Three Minute Recall (daughter, heaven, mountain) 3/3    Shamir clock face with all 12 numbers and set the hands to show 10 past 11.  Yes    Cognitive concerns identified deferred for follow up unless specifically addressed in assessment and plan.    Fall Risk Assessment  Has the patient had two or more falls in the last year or any fall with injury in the last year?  No    Safety Assessment  Throw rugs on floor.  No  Handrails on all stairs.  Yes  Good lighting in all hallways.  Yes  Difficulty hearing.  No  Patient counseled about all safety risks that were identified.    Functional Assessment ADLs  Are there any barriers preventing you from cooking for yourself or meeting nutritional needs?  No.    Are there any barriers preventing you from driving safely or obtaining transportation?  No.    Are there any barriers preventing you from using a telephone or calling for help?  No.    Are there any barriers preventing you from shopping?  No.    Are there any barriers preventing you from taking care of your own finances?  No.    Are there any barriers preventing you from managing your medications?  No.    Are there any barriers preventing you from showering, bathing or dressing yourself?  No.    Are you currently engaging in any exercise or physical activity?  Yes.     What is your perception of your health?  Good    Advance Care Planning  Do you have an Advance Directive, Living Will, Durable Power of , or POLST?                   Health Maintenance Summary            Overdue - COVID-19 Vaccine (4 - Booster for Moderna series) Overdue since 1/10/2022      11/15/2021  Imm Admin: MODERNA SARS-COV-2 VACCINE (12+)    04/01/2021  Imm Admin: MODERNA SARS-COV-2 VACCINE (12+)     03/04/2021  Imm Admin: MODERNA SARS-COV-2 VACCINE (12+)              Annual Wellness Visit (Every 366 Days) Next due on 11/10/2023      11/09/2022  Visit Dx: Medicare annual wellness visit, subsequent    11/03/2021  Visit Dx: Medicare annual wellness visit, subsequent    11/08/2019  Done              IMM DTaP/Tdap/Td Vaccine (2 - Td or Tdap) Next due on 10/1/2024      10/01/2014  Imm Admin: Tdap Vaccine              COLORECTAL CANCER SCREENING (COLONOSCOPY - Every 10 Years) Next due on 12/8/2024 12/08/2014  REFERRAL TO GI FOR COLONOSCOPY              HEPATITIS C SCREENING  Completed      10/02/2019  HEP C VIRUS ANTIBODY              IMM PNEUMOCOCCAL VACCINE: 65+ Years (Series Information) Completed      11/08/2019  Imm Admin: Pneumococcal polysaccharide vaccine (PPSV-23)    10/01/2017  Imm Admin: Pneumococcal Conjugate Vaccine (Prevnar/PCV-13)              IMM ZOSTER VACCINES (Series Information) Completed      07/08/2020  Imm Admin: Zoster Vaccine Recombinant (RZV) (SHINGRIX)    11/23/2019  Imm Admin: Zoster Vaccine Recombinant (RZV) (SHINGRIX)    10/01/2014  Imm Admin: Zoster Vaccine Live (ZVL) (Zostavax) - HISTORICAL DATA              IMM INFLUENZA (Series Information) Completed      10/21/2022  Imm Admin: Influenza Vaccine Adult HD    10/15/2021  Imm Admin: Influenza Vaccine Adult HD    09/30/2020  Imm Admin: Influenza Vaccine Adult HD    11/04/2019  Imm Admin: Influenza Vaccine Adult HD    10/09/2018  Imm Admin: Influenza Vaccine Quad Inj (Preserved)    Only the first 5 history entries have been loaded, but more history exists.              IMM MENINGOCOCCAL ACWY VACCINE (Series Information) Aged Out      No completion history exists for this topic.              Discontinued - RETINAL SCREENING  Discontinued        Frequency changed to Never automatically (Topic No Longer Applies)    12/01/2021  Referral for Retinal Screening Exam    01/29/2020  REFERRAL FOR RETINAL SCREENING EXAM              Discontinued  "- A1C SCREENING  Discontinued        Frequency changed to Never automatically (Topic No Longer Applies)    10/21/2022  HEMOGLOBIN A1C    10/15/2021  HEMOGLOBIN A1C    10/08/2020  HEMOGLOBIN A1C    10/02/2019  HEMOGLOBIN A1C    Only the first 5 history entries have been loaded, but more history exists.              Discontinued - FASTING LIPID PROFILE  Discontinued        Frequency changed to Never automatically (Topic No Longer Applies)    10/21/2022  Lipid Profile    10/15/2021  Lipid Profile    10/08/2020  Lipid Profile    10/09/2019  LipoFit by NMR    Only the first 5 history entries have been loaded, but more history exists.              Discontinued - URINE ACR / MICROALBUMIN  Discontinued        Frequency changed to Never automatically (Topic No Longer Applies)    10/02/2019  MICROALBUMIN CREAT RATIO URINE    06/19/2018  MICROALBUMIN CREAT RATIO URINE              Discontinued - SERUM CREATININE  Discontinued        Frequency changed to Never automatically (Topic No Longer Applies)    10/21/2022  Comp Metabolic Panel    10/15/2021  Comp Metabolic Panel    10/08/2020  Comp Metabolic Panel    10/02/2019  Comp Metabolic Panel    Only the first 5 history entries have been loaded, but more history exists.              Discontinued - IMM HEP B VACCINE  Discontinued      No completion history exists for this topic.                    Patient Care Team:  Prosper Duenas M.D. as PCP - General (Internal Medicine)     Ambulatory Vitals  /68 (BP Location: Left arm, Patient Position: Sitting, BP Cuff Size: Adult)   Pulse 65   Temp 36.4 °C (97.5 °F) (Temporal)   Resp 14   Ht 1.778 m (5' 10\")   Wt 86.2 kg (190 lb)   SpO2 99%   BMI 27.26 kg/m²     Physical Exam  Constitutional:       Appearance: Normal appearance.   Neck:      Vascular: No carotid bruit.   Cardiovascular:      Rate and Rhythm: Normal rate and regular rhythm.      Heart sounds: Normal heart sounds. No murmur heard.    No friction rub. No gallop. "   Pulmonary:      Effort: Pulmonary effort is normal.      Breath sounds: Normal breath sounds. No wheezing or rales.   Abdominal:      General: There is no distension.      Palpations: Abdomen is soft. There is no mass.      Tenderness: There is no abdominal tenderness.   Genitourinary:     Prostate: Normal.   Musculoskeletal:      Right lower leg: No edema.      Left lower leg: No edema.   Neurological:      General: No focal deficit present.       Component      Latest Ref [facility] 10/21/2022  8:30 AM   WBC      4.8 - 10.8 K/uL 8.5    RBC      4.70 - 6.10 M/uL 4.59 (L)    Hemoglobin      14.0 - 18.0 g/dL 14.5    Hematocrit      42.0 - 52.0 % 44.2    MCV      81.4 - 97.8 fL 96.3    MCH      27.0 - 33.0 pg 31.6    MCHC      33.7 - 35.3 g/dL 32.8 (L)    RDW      35.9 - 50.0 fL 46.5    Platelet Count      164 - 446 K/uL 264    MPV      9.0 - 12.9 fL 10.0    Neutrophils-Polys      44.00 - 72.00 % 62.90    Lymphocytes      22.00 - 41.00 % 23.70    Monocytes      0.00 - 13.40 % 9.10    Eosinophils      0.00 - 6.90 % 1.40    Basophils      0.00 - 1.80 % 1.10    Immature Granulocytes      0.00 - 0.90 % 1.80 (H)    Nucleated RBC      /100 WBC 0.00    Neutrophils (Absolute)      1.82 - 7.42 K/uL 5.35    Lymphs (Absolute)      1.00 - 4.80 K/uL 2.01    Monos (Absolute)      0.00 - 0.85 K/uL 0.77    Eos (Absolute)      0.00 - 0.51 K/uL 0.12    Baso (Absolute)      0.00 - 0.12 K/uL 0.09    Immature Granulocytes (abs)      0.00 - 0.11 K/uL 0.15 (H)    NRBC (Absolute)      K/uL 0.00    Sodium      135 - 145 mmol/L 135    Potassium      3.6 - 5.5 mmol/L 4.1    Chloride      96 - 112 mmol/L 99    Co2      20 - 33 mmol/L 25    Anion Gap      7.0 - 16.0  11.0    Glucose      65 - 99 mg/dL 119 (H)    Bun      8 - 22 mg/dL 21    Creatinine      0.50 - 1.40 mg/dL 1.23    Calcium      8.5 - 10.5 mg/dL 9.4    AST(SGOT)      12 - 45 U/L 19    ALT(SGPT)      2 - 50 U/L 19    Alkaline Phosphatase      30 - 99 U/L 72    Total Bilirubin      0.1 -  1.5 mg/dL 0.4    Albumin      3.2 - 4.9 g/dL 4.7    Total Protein      6.0 - 8.2 g/dL 7.6    Globulin      1.9 - 3.5 g/dL 2.9    A-G Ratio      g/dL 1.6    Color Yellow    Character Clear    Specific Gravity      <1.035  1.003    Ph      5.0 - 8.0  5.5    Glucose      Negative mg/dL Negative    Ketones      Negative mg/dL Negative    Protein      Negative mg/dL Negative    Bilirubin      Negative  Negative    Urobilinogen, Urine      Negative  0.2    Nitrite      Negative  Negative    Leukocyte Esterase      Negative  Negative    Occult Blood      Negative  Negative    Micro Urine Req see below    Cholesterol,Tot      100 - 199 mg/dL 182    Testosterone,Total      300 - 720 ng/dL 349    Triglycerides      0 - 149 mg/dL 108    Sex Hormone Bind Globulin      19 - 76 nmol/L 26    HDL      >=40 mg/dL 52    Free Testosterone      47 - 244 pg/mL 72    LDL      <100 mg/dL 108 (H)    Testosterone % Free      1.6 - 2.9 % 2.1    Glycohemoglobin      4.0 - 5.6 % 6.3 (H)    GFR If African American      >60 mL/min/1.73 m 2    SARS-CoV-2 Source    Estim. Avg Glu      mg/dL 134    GFR If Non African American      >60 mL/min/1.73 m 2    SARS-CoV-2 by PCR    Prostatic Specific Antigen Tot      0.00 - 4.00 ng/mL 1.57    25-Hydroxy Vitamin D 25      30 - 100 ng/mL 42    TSH      0.380 - 5.330 uIU/mL 1.500    Vitamin B12 -True Cobalamin      211 - 911 pg/mL 936 (H)    COVID Order Status    GFR (CKD-EPI)      >60 mL/min/1.73 m 2 63       (L) Low  (H) High  Component Ref Range & Units 2 wk ago 1 yr ago 2 yr ago 3 yr ago 4 yr ago   TSH 0.380 - 5.330 uIU/mL 1.500  2.300 CM  2.500 CM  2.110 CM  2.580 CM      Component Ref Range & Units 2 wk ago 1 yr ago 2 yr ago 3 yr ago 4 yr ago   Prostatic Specific Antigen Tot 0.00 - 4.00 ng/mL 1.57  1.61 CM  3.83 CM  2.96 CM  1.40 CM      Component Ref Range & Units 2 wk ago   (10/21/22) 1 yr ago   (10/15/21) 2 yr ago   (10/8/20) 3 yr ago   (10/2/19) 4 yr ago   (9/21/18) 4 yr ago   (6/19/18)    Glycohemoglobin 4.0 - 5.6 % 6.3 High   6.3 High  CM  6.1 High  R, CM  6.1 High  R, CM  6.2 High  R, CM  5.9 High  R, CM      Assessment and Plan:     1. Medicare annual wellness visit, subsequent        2. Factor 5 Leiden mutation, heterozygous (HCC)      Taking xarelto 20 mg qd      3. Prediabetes  CT-CARDIAC SCORING    US-TOTAL VASCULAR SCREENING (S/P)      4. History of DVT (deep vein thrombosis), nonprovoked      Taking xarelto 20 mg qd      5. Granulocytosis, elevated immature granulocytes  CBC WITH DIFFERENTIAL        Discussed consider updated vascular screening with CAC and arterial US; if results show moderate vascular disease then treating cholesterol with statin would be advisable and treating prediabetes considering rybelsus could also be beneficial. Repeat CBC 6 months due to elevated absolute immature granulocyte count.    Prosper Duenas M.D.

## 2023-02-15 ENCOUNTER — OFFICE VISIT (OUTPATIENT)
Dept: INTERNAL MEDICINE | Facility: IMAGING CENTER | Age: 68
End: 2023-02-15
Payer: MEDICARE

## 2023-02-15 VITALS
TEMPERATURE: 96.9 F | WEIGHT: 196 LBS | HEIGHT: 70 IN | RESPIRATION RATE: 14 BRPM | HEART RATE: 67 BPM | OXYGEN SATURATION: 96 % | DIASTOLIC BLOOD PRESSURE: 70 MMHG | BODY MASS INDEX: 28.06 KG/M2 | SYSTOLIC BLOOD PRESSURE: 112 MMHG

## 2023-02-15 DIAGNOSIS — J32.4 CHRONIC PANSINUSITIS: ICD-10-CM

## 2023-02-15 DIAGNOSIS — R19.7 DIARRHEA, UNSPECIFIED TYPE: ICD-10-CM

## 2023-02-15 DIAGNOSIS — Z86.39 HISTORY OF NON ANEMIC VITAMIN B12 DEFICIENCY: ICD-10-CM

## 2023-02-15 DIAGNOSIS — Z76.89 ESTABLISHING CARE WITH NEW DOCTOR, ENCOUNTER FOR: ICD-10-CM

## 2023-02-15 DIAGNOSIS — Z86.718 HISTORY OF DVT (DEEP VEIN THROMBOSIS): ICD-10-CM

## 2023-02-15 DIAGNOSIS — R73.03 PREDIABETES: ICD-10-CM

## 2023-02-15 DIAGNOSIS — D68.51 FACTOR 5 LEIDEN MUTATION, HETEROZYGOUS (HCC): ICD-10-CM

## 2023-02-15 PROBLEM — J34.3 HYPERTROPHY OF NASAL TURBINATES: Status: ACTIVE | Noted: 2023-02-15

## 2023-02-15 PROCEDURE — 99215 OFFICE O/P EST HI 40 MIN: CPT | Performed by: FAMILY MEDICINE

## 2023-02-15 RX ORDER — KETOCONAZOLE 20 MG/G
CREAM TOPICAL
COMMUNITY
Start: 2023-01-18

## 2023-02-15 RX ORDER — ALPRAZOLAM 1 MG/1
TABLET ORAL
COMMUNITY
Start: 2022-09-19 | End: 2024-01-03

## 2023-02-15 ASSESSMENT — FIBROSIS 4 INDEX: FIB4 SCORE: 1.16

## 2023-02-15 ASSESSMENT — PATIENT HEALTH QUESTIONNAIRE - PHQ9: CLINICAL INTERPRETATION OF PHQ2 SCORE: 0

## 2023-02-15 NOTE — PROGRESS NOTES
"Chief Complaint   Patient presents with    Hemorrhoids       Subjective:     HPI:   Mehul Peraza is a 68 y.o. male here  to establish care and discuss the evaluation and management of:     1.  Diarrhea for 1 week-  Denies recent travel, ill contacts, recent antibiotic use  He denies new medications or supplements  He describes it as more soft stool than watery    No blood or other abnormal findings    2.  History of factor V/multiple DVTs and PE  He is on Xarelto and has not had further clotting problems since        3.  Sinus-managed by Dr. Peoples/ENT-patient has opted to avoid surgery for now as it is manageable  Problem   Chronic Pansinusitis   Hypertrophy of Nasal Turbinates        4.  Last year he had COVID-he took Paxlovid and had a rebound episode  He has questions regarding the COVID vaccines       Objective:     /70   Pulse 67   Temp 36.1 °C (96.9 °F)   Resp 14   Ht 1.778 m (5' 10\")   Wt 88.9 kg (196 lb)   SpO2 96%  Body mass index is 28.12 kg/m².    Physical Exam:  Physical Exam  Constitutional: Well-developed and well-nourished. Not diaphoretic. No distress.   Skin: Skin is warm and dry. No rash noted.  Head: Atraumatic without lesions.  Eyes: Conjunctivae and extraocular motions are normal.   Ears:  External ears unremarkable.    Nose: Nares patent. Mucosa without edema or erythema. No discharge. No facial tenderness.     Neck: Supple, No thyromegaly present. No JVD  Cardiovascular: Regular rate and rhythm.   Chest: Effort normal.  Chest clear  Abdomen: Obese, no organomegaly appreciated , no focal tenderness without distention.  .  Extremities: No cyanosis, clubbing, erythema, nor edema.   Neurological: Alert and oriented x 3.    Psychiatric:  Behavior, mood, and affect are appropriate       Lab Results   Component Value Date/Time    HBA1C 6.3 (H) 10/21/2022 08:30 AM       Assessment and Plan:     The following treatment plan was discussed/researched:  No problem-specific " Assessment & Plan notes found for this encounter.  1. Diarrhea, unspecified type-unclear etiology  Discussed a brat diet and OTC Imodium  Follow-up in 2 or 3 days if not improving consider ordering stool studies    2. Prediabetes-borderline diabetes  We will continue to monitor    3. Factor 5 Leiden mutation, heterozygous (HCC)-stable on Xarelto    4. History of DVT (deep vein thrombosis), nonprovoked-stable    5. History of non anemic vitamin B12 deficiency-monitor B12 levels    6. Chronic pansinusitis-we will avoid surgery for now, management per Dr. Peoples    7. Establishing care with new doctor, encounter for    Other orders  - ketoconazole (NIZORAL) 2 % Cream  - ALPRAZolam (XANAX) 1 MG Tab                      HCC Gap Form    Diagnosis to address: D68.51 - Factor 5 Leiden mutation, heterozygous (HCC)  Assessment and plan: Chronic, stable. Continue with current defined treatment plan: He will be on Xarelto for the rest of his life. Follow-up at least annually.  Diagnosis: D68.59 - Hypercoagulation syndrome (HCC)  Assessment and plan: Chronic, stable due to factor V deficiency. Continue with current defined treatment plan: Xarelto. Follow-up at least annually.  Last edited 02/15/23 12:26 PST by Jessica Bean M.D.                                                                                                                                                                             Any change or worsening of signs or symptoms, patient encouraged to follow-up or report to emergency room for further evaluation. Patient verbalizes understanding and agrees.    Follow-Up: As needed /annual wellness visit due in November      PLEASE NOTE: This dictation was created using voice recognition software. I have made every reasonable attempt to correct obvious errors, but I expect that there are errors of grammar and possibly content that I did not discover before finalizing the note.      My total time spent caring for  the patient on the day of the encounter was  greater than 40 minutes.   This includes obtaining history, reviewing chart, physical exam, patient education, reviewing outside records, placing orders, interpreting tests and coordinating care.

## 2023-02-16 RX ORDER — HYDROCORTISONE ACETATE 25 MG/1
25 SUPPOSITORY RECTAL EVERY 12 HOURS
Qty: 20 SUPPOSITORY | Refills: 0 | Status: SHIPPED
Start: 2023-02-16 | End: 2024-01-03

## 2023-06-12 DIAGNOSIS — Z86.718 HISTORY OF DVT (DEEP VEIN THROMBOSIS): ICD-10-CM

## 2023-06-12 DIAGNOSIS — Z86.711 HISTORY OF PULMONARY EMBOLUS (PE): ICD-10-CM

## 2023-06-12 DIAGNOSIS — D68.51 FACTOR 5 LEIDEN MUTATION, HETEROZYGOUS (HCC): ICD-10-CM

## 2024-01-03 ENCOUNTER — OFFICE VISIT (OUTPATIENT)
Dept: INTERNAL MEDICINE | Facility: IMAGING CENTER | Age: 69
End: 2024-01-03
Payer: MEDICARE

## 2024-01-03 VITALS
TEMPERATURE: 97.6 F | RESPIRATION RATE: 12 BRPM | DIASTOLIC BLOOD PRESSURE: 70 MMHG | WEIGHT: 197 LBS | BODY MASS INDEX: 28.2 KG/M2 | HEART RATE: 80 BPM | OXYGEN SATURATION: 99 % | HEIGHT: 70 IN | SYSTOLIC BLOOD PRESSURE: 124 MMHG

## 2024-01-03 DIAGNOSIS — R79.89 ABNORMAL CBC: ICD-10-CM

## 2024-01-03 DIAGNOSIS — R35.1 BPH ASSOCIATED WITH NOCTURIA: ICD-10-CM

## 2024-01-03 DIAGNOSIS — R73.01 ELEVATED FASTING GLUCOSE: ICD-10-CM

## 2024-01-03 DIAGNOSIS — D68.59 HYPERCOAGULATION SYNDROME (HCC): ICD-10-CM

## 2024-01-03 DIAGNOSIS — Z86.718 HISTORY OF DVT (DEEP VEIN THROMBOSIS): ICD-10-CM

## 2024-01-03 DIAGNOSIS — D68.51 FACTOR 5 LEIDEN MUTATION, HETEROZYGOUS (HCC): ICD-10-CM

## 2024-01-03 DIAGNOSIS — E78.5 HYPERLIPIDEMIA, UNSPECIFIED HYPERLIPIDEMIA TYPE: ICD-10-CM

## 2024-01-03 DIAGNOSIS — Z00.00 MEDICARE ANNUAL WELLNESS VISIT, SUBSEQUENT: ICD-10-CM

## 2024-01-03 DIAGNOSIS — N40.1 BPH ASSOCIATED WITH NOCTURIA: ICD-10-CM

## 2024-01-03 DIAGNOSIS — R73.03 PREDIABETES: ICD-10-CM

## 2024-01-03 PROCEDURE — 3078F DIAST BP <80 MM HG: CPT | Performed by: INTERNAL MEDICINE

## 2024-01-03 PROCEDURE — G0439 PPPS, SUBSEQ VISIT: HCPCS | Performed by: INTERNAL MEDICINE

## 2024-01-03 PROCEDURE — 3074F SYST BP LT 130 MM HG: CPT | Performed by: INTERNAL MEDICINE

## 2024-01-03 ASSESSMENT — ENCOUNTER SYMPTOMS: GENERAL WELL-BEING: EXCELLENT

## 2024-01-03 ASSESSMENT — FIBROSIS 4 INDEX: FIB4 SCORE: 1.16

## 2024-01-03 ASSESSMENT — PATIENT HEALTH QUESTIONNAIRE - PHQ9: CLINICAL INTERPRETATION OF PHQ2 SCORE: 0

## 2024-01-03 ASSESSMENT — ACTIVITIES OF DAILY LIVING (ADL): BATHING_REQUIRES_ASSISTANCE: 0

## 2024-01-04 NOTE — PROGRESS NOTES
68 y.o. male presents for the followin-year-old male. He is here to establish care and complete HRA.  No balance issues.  No cognitive issues.  No depression.    He is a history of factor five Leiden mutation, prediabetes and chronic sinus disease. His current medications include Xarelto for history of DVT and factor five. He is otherwise on no regular medications.    His last laboratory were in 2022. At that time he had an increase in granulocytes. His fasting sugar was 119 with an A1c of 6.3. His LDL cholesterol was 108. PSA has been stable at 1.57. Thyroid function tests are normal. Testosterone is low normal at 349.    Screening test:  PSA discussed above.  Colonoscopy-2014. Normal colonoscopy with tenure follow-up recommended.    Immunizations:  due for flu. Due for RSV. Patient has received three Covid vaccinations.    Annual Wellness Visit/Health Risk Assessment:    Past medical:  Past Medical History:   Diagnosis Date    B12 deficiency 2019    BPH associated with nocturia 2019    Diverticulosis of colon 2019    Colonoscopy Dec. 2014: Sigmoid diverticulosis mild    Factor 5 Leiden mutation, heterozygous (HCC) 2018    Family history of colon cancer 2018    History of DVT (deep vein thrombosis) 2018    2003 nonprovoked and 2016 RLE nonprovoked    History of pulmonary embolus (PE) 2018    Hypercoagulation syndrome (HCC) 2018    Long term current use of anticoagulant 2018    Prediabetes 2018    Vitamin D deficiency 2018       Past surgical:  Past Surgical History:   Procedure Laterality Date    APPENDECTOMY      Age 6    HERNIA REPAIR Bilateral     UMBILICAL HERNIA REPAIR         Family history: relating to possible risk factors for your patient  Family History   Problem Relation Age of Onset    Cancer Mother         colon    Clotting Disorder Mother         Factor 5 Leiden Mutation (hypercoagulation)    Colon Cancer Mother      Cancer Father         Pancreatic cancer    Diabetes Father     Genetic Disorder Sister         Factor 5    Clotting Disorder Sister     Thyroid Neg Hx        Current Providers (including home care/DME’s):   No Patient Care Coordination Note on file.      Patient Care Team:  Guilherme Perez M.D. as PCP - General (Internal Medicine)  KALPANA Aguilar R.N.      Medications:   Current Outpatient Medications Ordered in Epic   Medication Sig Dispense Refill    rivaroxaban (XARELTO) 20 MG Tab tablet Take 1 Tablet by mouth with dinner. 90 Tablet 3    ketoconazole (NIZORAL) 2 % Cream       famotidine (PEPCID) 20 MG Tab Take 1 Tablet by mouth every day.      Cyanocobalamin (VITAMIN B-12) 5000 MCG SL Tab Place 5,000 mcg under tongue every day.      Cholecalciferol (VITAMIN D) 2000 units Cap Take 1 Capsule by mouth every day. 30 Cap      No current Epic-ordered facility-administered medications on file.       Supplements (calcium/vitamins): if not lisited in medications    Chief Complaint   Patient presents with    Medicare Annual Wellness         HPI:  Mehul Peraza is a 68 y.o. here for Medicare Annual Wellness Visit     Patient Active Problem List    Diagnosis Date Noted    Chronic pansinusitis 02/15/2023    Hypertrophy of nasal turbinates 02/15/2023    Diverticulosis of colon 11/09/2019    B12 deficiency 11/08/2019    BPH associated with nocturia 11/08/2019    Factor 5 Leiden mutation, heterozygous (HCC) 06/18/2018    History of pulmonary embolus (PE) 06/18/2018    History of DVT (deep vein thrombosis), nonprovoked 06/18/2018    Prediabetes 06/18/2018    Vitamin D deficiency 06/18/2018    Hypercoagulation syndrome (HCC) 06/18/2018    Family history of colon cancer 06/18/2018    Long term current use of anticoagulant 06/18/2018       Current Outpatient Medications   Medication Sig Dispense Refill    rivaroxaban (XARELTO) 20 MG Tab tablet Take 1 Tablet by mouth with dinner. 90 Tablet 3     ketoconazole (NIZORAL) 2 % Cream       famotidine (PEPCID) 20 MG Tab Take 1 Tablet by mouth every day.      Cyanocobalamin (VITAMIN B-12) 5000 MCG SL Tab Place 5,000 mcg under tongue every day.      Cholecalciferol (VITAMIN D) 2000 units Cap Take 1 Capsule by mouth every day. 30 Cap      No current facility-administered medications for this visit.            Current supplements as per medication list.       Allergies: Patient has no known allergies.    Current social contact/activities:  Social with friends and family.    He  reports that he has never smoked. He has never used smokeless tobacco. He reports current alcohol use. He reports that he does not use drugs.  Counseling given: Not Answered        DPA/Advanced Directive: In epic      ROS:    Gait: Uses : None  Ostomy: No  Other tubes: no   Amputations: no   Chronic oxygen use: no   Last eye exam: 2022  : Denies any urinary leakage during the last 6 months incontinence.       Screening:  No Patient Care Coordination Note on file.      Depression Screening  Little interest or pleasure in doing things?  0 - not at all  Feeling down, depressed , or hopeless? 0 - not at all  Patient Health Questionnaire Score: 0     If depressive symptoms identified deferred to follow up visit unless specifically addressed in assessment and plan.    Interpretation of PHQ-9 Total Score   Score Severity   1-4 No Depression   5-9 Mild Depression   10-14 Moderate Depression   15-19 Moderately Severe Depression   20-27 Severe Depression    Screening for Cognitive Impairment  Do you or any of your friends or family members have any concern about your memory? No  Three Minute Recall (Banana, Sunrise, Chair) 3/3    Shamir clock face with all 12 numbers and set the hands to show 20 past 8.  Yes    Cognitive concerns identified deferred for follow up unless specifically addressed in assessment and plan.    Fall Risk Assessment  Has the patient had two or more falls in the last year or any  fall with injury in the last year?  No    Safety Assessment  Do you always wear your seatbelt?  Yes  Any changes to home needed to function safely? No  Difficulty hearing.  No  Patient counseled about all safety risks that were identified.    Functional Assessment ADLs  Are there any barriers preventing you from cooking for yourself or meeting nutritional needs?  No.    Are there any barriers preventing you from driving safely or obtaining transportation?  No.    Are there any barriers preventing you from using a telephone or calling for help?  No    Are there any barriers preventing you from shopping?  No.    Are there any barriers preventing you from taking care of your own finances?  No    Are there any barriers preventing you from managing your medications?       Are there any barriers preventing you from showering, bathing or dressing yourself? No    Are there any barriers preventing you from doing housework or laundry? No  Are there any barriers preventing you from using the toilet?No  Are you currently engaging in any exercise or physical activity?  No.      Self-Assessment of Health  What is your perception of your health? Excellent  Do you sleep more than six hours a night? Yes  In the past 7 days, how much did pain keep you from doing your normal work?    Do you spend quality time with family or friends (virtually or in person)? Yes  Do you usually eat a heart healthy diet that constists of a variety of fruits, vegetables, whole grains and fiber? Yes  Do you eat foods high in fat and/or Fast Food more than three times per week? No    Advance Care Planning  Do you have an Advance Directive, Living Will, Durable Power of , or POLST? Yes  Advance Directive       is on file      Health Maintenance Summary            Overdue - COVID-19 Vaccine (5 - 2023-24 season) Overdue since 11/26/2023      10/01/2023  Imm Admin: Pfizer-biontech Covid-19 Vaccine ([id number] Formula)    11/15/2021  Imm Admin: MODERNA  SARS-COV-2 VACCINE (12+)    04/01/2021  Imm Admin: MODERNA SARS-COV-2 VACCINE (12+)    03/04/2021  Imm Admin: MODERNA SARS-COV-2 VACCINE (12+)              IMM DTaP/Tdap/Td Vaccine (2 - Td or Tdap) Next due on 10/1/2024      10/01/2014  Imm Admin: Tdap Vaccine              Colorectal Cancer Screening (Colonoscopy - Every 10 Years) Tentatively due on 12/8/2024 12/08/2014  REFERRAL TO GI FOR COLONOSCOPY              Annual Wellness Visit (Every 366 Days) Next due on 1/3/2025      01/03/2024  Visit Dx: Medicare annual wellness visit, subsequent    11/09/2022  Visit Dx: Medicare annual wellness visit, subsequent    11/03/2021  Visit Dx: Medicare annual wellness visit, subsequent    11/08/2019  Done              Hepatitis C Screening  Tentatively Complete      10/02/2019  Hepatitis C Antibody component of HEP C VIRUS ANTIBODY              Pneumococcal Vaccine: 65+ Years (Series Information) Completed      11/08/2019  Imm Admin: Pneumococcal polysaccharide vaccine (PPSV-23)    10/01/2017  Imm Admin: Pneumococcal Conjugate Vaccine (Prevnar/PCV-13)              Zoster (Shingles) Vaccines (Series Information) Completed      07/08/2020  Imm Admin: Zoster Vaccine Recombinant (RZV) (SHINGRIX)    11/23/2019  Imm Admin: Zoster Vaccine Recombinant (RZV) (SHINGRIX)    10/01/2014  Imm Admin: Zoster Vaccine Live (ZVL) (Zostavax) - HISTORICAL DATA              Influenza Vaccine (Series Information) Completed      10/01/2023  Imm Admin: Influenza Vaccine Adult HD    10/21/2022  Imm Admin: Influenza Vaccine Adult HD    10/15/2021  Imm Admin: Influenza Vaccine Adult HD    09/30/2020  Imm Admin: Influenza Vaccine Adult HD    11/04/2019  Imm Admin: Influenza Vaccine Adult HD    Only the first 5 history entries have been loaded, but more history exists.              Hepatitis A Vaccine (Hep A) (Series Information) Aged Out      No completion history exists for this topic.              HPV Vaccines (Series Information) Aged Out      No  completion history exists for this topic.              Polio Vaccine (Inactivated Polio) (Series Information) Aged Out      No completion history exists for this topic.              Meningococcal Immunization (Series Information) Aged Out      No completion history exists for this topic.              Discontinued - Diabetes: Retinopathy Screening  Discontinued        Frequency changed to Never automatically (Topic No Longer Applies)    12/01/2021  Referral for Retinal Screening Exam    01/29/2020  REFERRAL FOR RETINAL SCREENING EXAM              Discontinued - A1c Screening  Discontinued        Frequency changed to Never automatically (Topic No Longer Applies)    10/21/2022  HEMOGLOBIN A1C    10/15/2021  HEMOGLOBIN A1C    10/08/2020  HEMOGLOBIN A1C    10/02/2019  HEMOGLOBIN A1C    Only the first 5 history entries have been loaded, but more history exists.              Discontinued - Fasting Lipid Profile  Discontinued        Frequency changed to Never automatically (Topic No Longer Applies)    10/21/2022  Lipid Profile    10/15/2021  Lipid Profile    10/08/2020  Lipid Profile    10/09/2019  LipoFit by NMR    Only the first 5 history entries have been loaded, but more history exists.              Discontinued - Diabetes: Urine Protein Screening  Discontinued        Frequency changed to Never automatically (Topic No Longer Applies)    10/02/2019  MICROALBUMIN CREAT RATIO URINE    06/19/2018  MICROALBUMIN CREAT RATIO URINE              Discontinued - SERUM CREATININE  Discontinued        Frequency changed to Never automatically (Topic No Longer Applies)    10/21/2022  Comp Metabolic Panel    10/15/2021  Comp Metabolic Panel    10/08/2020  Comp Metabolic Panel    10/02/2019  Comp Metabolic Panel    Only the first 5 history entries have been loaded, but more history exists.              Discontinued - Hepatitis B Vaccine (Hep B)  Discontinued      No completion history exists for this topic.                    Patient  "Care Team:  Guilherme Perez M.D. as PCP - General (Internal Medicine)  KALPANA Aguilar R.N.        Social History     Tobacco Use    Smoking status: Never    Smokeless tobacco: Never   Substance Use Topics    Alcohol use: Yes     Comment: 5-6 mixed drinks a week    Drug use: No     Family History   Problem Relation Age of Onset    Cancer Mother         colon    Clotting Disorder Mother         Factor 5 Leiden Mutation (hypercoagulation)    Colon Cancer Mother     Cancer Father         Pancreatic cancer    Diabetes Father     Genetic Disorder Sister         Factor 5    Clotting Disorder Sister     Thyroid Neg Hx      He  has a past medical history of B12 deficiency (11/8/2019), BPH associated with nocturia (11/8/2019), Diverticulosis of colon (11/9/2019), Factor 5 Leiden mutation, heterozygous (HCC) (6/18/2018), Family history of colon cancer (6/18/2018), History of DVT (deep vein thrombosis) (6/18/2018), History of pulmonary embolus (PE) (6/18/2018), Hypercoagulation syndrome (HCC) (6/18/2018), Long term current use of anticoagulant (6/18/2018), Prediabetes (6/18/2018), and Vitamin D deficiency (6/18/2018).   Past Surgical History:   Procedure Laterality Date    APPENDECTOMY      Age 6    HERNIA REPAIR Bilateral     UMBILICAL HERNIA REPAIR         Exam:     /70 (BP Location: Left arm, Patient Position: Sitting, BP Cuff Size: Adult)   Pulse 80   Temp 36.4 °C (97.6 °F) (Temporal)   Resp 12   Ht 1.778 m (5' 10\")   Wt 89.4 kg (197 lb)   SpO2 99%  Body mass index is 28.27 kg/m².    Hearing good.    Dentition good  Alert, oriented in no acute distress.  Eye contact is good, speech goal directed, affect calm  General: Mildly overweight.  No distress.  Normal appearing.  HEENT: Pupils are equal.  Conjunctiva is normal.  Head is normal appearing.  Ears, canals and tympanic membranes are normal.  Oral cavity is pink and moist without lesion.  Neck: Supple without JVD or bruit.  Thyroid is " not enlarged.  Pulmonary: Clear with good breath sounds.  Cardiovascular regular rate and rhythm.  No murmur auscultated.  Carotid, radial and pedal pulses are intact.  Abdomen: Soft, nontender, nondistended.  Normal bowel sounds.  Organs are not enlarged.  Neurologic: Cranial nerves intact.  Strength and sensation are normal.  Normal patellar reflex.  Skin: No obvious lesions  Lymph: No cervical, supraclavicular, axillary, abdominal or inguinal adenopathy noted.  Genitourinary:  Rectal tone is normal.    Prostate is not enlarged, soft and without nodule.  Slight asymmetry with right lobe greater than left    Assessment and Plan. The following treatment and monitoring plan is recommended:    1. Medicare annual wellness visit, subsequent        2. Prediabetes        3. Factor 5 Leiden mutation, heterozygous (HCC)        4. Hypercoagulation syndrome (HCC)        5. History of DVT (deep vein thrombosis)        6. BPH associated with nocturia  PROSTATE SPECIFIC AG DIAGNOSTIC    URINALYSIS,CULTURE IF INDICATED      7. Abnormal CBC  CBC WITH DIFFERENTIAL      8. Elevated fasting glucose  Comp Metabolic Panel    HEMOGLOBIN A1C      9. Hyperlipidemia, unspecified hyperlipidemia type  Comp Metabolic Panel    Lipid Profile        Healthy 68-year-old male.  He would benefit from weight loss.  We discussed screening and immunizations.  He will be due for colonoscopy next year.    Chronic issues are stable.  No complications from factor V Leiden mutation since starting Xarelto.  He is due for laboratory follow-up on chronic issues.  Moderate BPH symptoms but he is not interested in pharmacotherapy at this time.    Services suggested: No services required at this time  Health Care Screening: Age-appropriate preventive services Medicare covers discussed today and ordered if indicated.  Referrals offered: Community-based lifestyle interventions to reduce health risks and promote self-management and wellness, fall prevention,  nutrition, physical activity, tobacco-use cessation, weight loss, and mental health services as per orders if indicated.    Discussion today about general wellness and lifestyle habits:    Prevent falls and reduce trip hazards; Cautioned about securing or removing rugs.  Have a working fire alarm and carbon monoxide detector;   Engage in regular physical activity and social activities       Follow-up: 1 year for HRA.

## 2024-02-09 ENCOUNTER — NON-PROVIDER VISIT (OUTPATIENT)
Dept: INTERNAL MEDICINE | Facility: IMAGING CENTER | Age: 69
End: 2024-02-09
Payer: MEDICARE

## 2024-02-09 ENCOUNTER — HOSPITAL ENCOUNTER (OUTPATIENT)
Facility: MEDICAL CENTER | Age: 69
End: 2024-02-09
Attending: INTERNAL MEDICINE
Payer: MEDICARE

## 2024-02-09 DIAGNOSIS — R79.89 ABNORMAL CBC: ICD-10-CM

## 2024-02-09 DIAGNOSIS — E78.5 HYPERLIPIDEMIA, UNSPECIFIED HYPERLIPIDEMIA TYPE: ICD-10-CM

## 2024-02-09 DIAGNOSIS — D68.59 HYPERCOAGULATION SYNDROME (HCC): ICD-10-CM

## 2024-02-09 DIAGNOSIS — R73.03 PREDIABETES: ICD-10-CM

## 2024-02-09 DIAGNOSIS — R73.01 ELEVATED FASTING GLUCOSE: ICD-10-CM

## 2024-02-09 DIAGNOSIS — N40.1 BPH ASSOCIATED WITH NOCTURIA: ICD-10-CM

## 2024-02-09 DIAGNOSIS — D68.51 FACTOR 5 LEIDEN MUTATION, HETEROZYGOUS (HCC): ICD-10-CM

## 2024-02-09 DIAGNOSIS — Z86.718 HISTORY OF DVT (DEEP VEIN THROMBOSIS): ICD-10-CM

## 2024-02-09 DIAGNOSIS — R35.1 BPH ASSOCIATED WITH NOCTURIA: ICD-10-CM

## 2024-02-09 DIAGNOSIS — E55.9 VITAMIN D DEFICIENCY: ICD-10-CM

## 2024-02-09 DIAGNOSIS — Z86.711 HISTORY OF PULMONARY EMBOLUS (PE): ICD-10-CM

## 2024-02-09 LAB
25(OH)D3 SERPL-MCNC: 40 NG/ML (ref 30–100)
ALBUMIN SERPL BCP-MCNC: 4.7 G/DL (ref 3.2–4.9)
ALBUMIN/GLOB SERPL: 1.6 G/DL
ALP SERPL-CCNC: 61 U/L (ref 30–99)
ALT SERPL-CCNC: 40 U/L (ref 2–50)
ANION GAP SERPL CALC-SCNC: 11 MMOL/L (ref 7–16)
APPEARANCE UR: CLEAR
AST SERPL-CCNC: 28 U/L (ref 12–45)
BASOPHILS # BLD AUTO: 0.5 % (ref 0–1.8)
BASOPHILS # BLD: 0.03 K/UL (ref 0–0.12)
BILIRUB SERPL-MCNC: 0.5 MG/DL (ref 0.1–1.5)
BILIRUB UR QL STRIP.AUTO: NEGATIVE
BUN SERPL-MCNC: 27 MG/DL (ref 8–22)
CALCIUM ALBUM COR SERPL-MCNC: 8.9 MG/DL (ref 8.5–10.5)
CALCIUM SERPL-MCNC: 9.5 MG/DL (ref 8.5–10.5)
CHLORIDE SERPL-SCNC: 103 MMOL/L (ref 96–112)
CHOLEST SERPL-MCNC: 184 MG/DL (ref 100–199)
CO2 SERPL-SCNC: 23 MMOL/L (ref 20–33)
COLOR UR: YELLOW
CREAT SERPL-MCNC: 1.05 MG/DL (ref 0.5–1.4)
EOSINOPHIL # BLD AUTO: 0.06 K/UL (ref 0–0.51)
EOSINOPHIL NFR BLD: 1 % (ref 0–6.9)
ERYTHROCYTE [DISTWIDTH] IN BLOOD BY AUTOMATED COUNT: 46 FL (ref 35.9–50)
EST. AVERAGE GLUCOSE BLD GHB EST-MCNC: 131 MG/DL
GFR SERPLBLD CREATININE-BSD FMLA CKD-EPI: 76 ML/MIN/1.73 M 2
GLOBULIN SER CALC-MCNC: 2.9 G/DL (ref 1.9–3.5)
GLUCOSE SERPL-MCNC: 120 MG/DL (ref 65–99)
GLUCOSE UR STRIP.AUTO-MCNC: NEGATIVE MG/DL
HBA1C MFR BLD: 6.2 % (ref 4–5.6)
HCT VFR BLD AUTO: 45.7 % (ref 42–52)
HDLC SERPL-MCNC: 55 MG/DL
HGB BLD-MCNC: 15.3 G/DL (ref 14–18)
IMM GRANULOCYTES # BLD AUTO: 0.03 K/UL (ref 0–0.11)
IMM GRANULOCYTES NFR BLD AUTO: 0.5 % (ref 0–0.9)
KETONES UR STRIP.AUTO-MCNC: NEGATIVE MG/DL
LDLC SERPL CALC-MCNC: 102 MG/DL
LEUKOCYTE ESTERASE UR QL STRIP.AUTO: NEGATIVE
LYMPHOCYTES # BLD AUTO: 2.15 K/UL (ref 1–4.8)
LYMPHOCYTES NFR BLD: 35.7 % (ref 22–41)
MCH RBC QN AUTO: 32.6 PG (ref 27–33)
MCHC RBC AUTO-ENTMCNC: 33.5 G/DL (ref 32.3–36.5)
MCV RBC AUTO: 97.2 FL (ref 81.4–97.8)
MICRO URNS: NORMAL
MONOCYTES # BLD AUTO: 0.63 K/UL (ref 0–0.85)
MONOCYTES NFR BLD AUTO: 10.5 % (ref 0–13.4)
NEUTROPHILS # BLD AUTO: 3.12 K/UL (ref 1.82–7.42)
NEUTROPHILS NFR BLD: 51.8 % (ref 44–72)
NITRITE UR QL STRIP.AUTO: NEGATIVE
NRBC # BLD AUTO: 0 K/UL
NRBC BLD-RTO: 0 /100 WBC (ref 0–0.2)
PH UR STRIP.AUTO: 6 [PH] (ref 5–8)
PLATELET # BLD AUTO: 205 K/UL (ref 164–446)
PMV BLD AUTO: 10.2 FL (ref 9–12.9)
POTASSIUM SERPL-SCNC: 4.5 MMOL/L (ref 3.6–5.5)
PROT SERPL-MCNC: 7.6 G/DL (ref 6–8.2)
PROT UR QL STRIP: NEGATIVE MG/DL
PSA SERPL-MCNC: 1.13 NG/ML (ref 0–4)
RBC # BLD AUTO: 4.7 M/UL (ref 4.7–6.1)
RBC UR QL AUTO: NEGATIVE
SODIUM SERPL-SCNC: 137 MMOL/L (ref 135–145)
SP GR UR STRIP.AUTO: 1.01
TRIGL SERPL-MCNC: 134 MG/DL (ref 0–149)
UROBILINOGEN UR STRIP.AUTO-MCNC: 0.2 MG/DL
WBC # BLD AUTO: 6 K/UL (ref 4.8–10.8)

## 2024-02-09 PROCEDURE — 83036 HEMOGLOBIN GLYCOSYLATED A1C: CPT

## 2024-02-09 PROCEDURE — 85025 COMPLETE CBC W/AUTO DIFF WBC: CPT

## 2024-02-09 PROCEDURE — 82306 VITAMIN D 25 HYDROXY: CPT

## 2024-02-09 PROCEDURE — 80053 COMPREHEN METABOLIC PANEL: CPT

## 2024-02-09 PROCEDURE — 81003 URINALYSIS AUTO W/O SCOPE: CPT

## 2024-02-09 PROCEDURE — 84153 ASSAY OF PSA TOTAL: CPT

## 2024-02-09 PROCEDURE — 83695 ASSAY OF LIPOPROTEIN(A): CPT

## 2024-02-09 PROCEDURE — 82172 ASSAY OF APOLIPOPROTEIN: CPT

## 2024-02-09 PROCEDURE — 99999 PR NO CHARGE: CPT

## 2024-02-09 PROCEDURE — 80061 LIPID PANEL: CPT

## 2024-02-09 NOTE — PROGRESS NOTES
Mehul Peraza is a 69 y.o. male here for a non-provider visit for a lab draw on 2/9/2024 at 9:08 AM.    Procedure performed:  Venipuncture     Anatomical site:  Left Antecubital Area    Equipment used:  21 g vacutainer     Labs drawn:  follow up labs    Ordering provider:  Guilherme Perez MD    Lab draw completed by:  Filomena Vinson R.N.

## 2024-02-12 LAB — APO B100 SERPL-MCNC: 100 MG/DL (ref 66–133)

## 2024-02-13 LAB — LPA SERPL-MCNC: <6 MG/DL

## 2024-06-20 DIAGNOSIS — D68.51 FACTOR 5 LEIDEN MUTATION, HETEROZYGOUS (HCC): ICD-10-CM

## 2024-06-20 DIAGNOSIS — Z86.718 HISTORY OF DVT (DEEP VEIN THROMBOSIS): ICD-10-CM

## 2024-06-20 DIAGNOSIS — Z86.711 HISTORY OF PULMONARY EMBOLUS (PE): ICD-10-CM

## 2024-07-12 ENCOUNTER — NON-PROVIDER VISIT (OUTPATIENT)
Dept: INTERNAL MEDICINE | Facility: IMAGING CENTER | Age: 69
End: 2024-07-12
Payer: MEDICARE

## 2024-07-12 DIAGNOSIS — R05.1 ACUTE COUGH: ICD-10-CM

## 2024-07-12 DIAGNOSIS — J02.9 SORE THROAT: ICD-10-CM

## 2024-07-12 LAB
FLUAV RNA SPEC QL NAA+PROBE: NEGATIVE
FLUBV RNA SPEC QL NAA+PROBE: NEGATIVE
RSV RNA SPEC QL NAA+PROBE: NEGATIVE
SARS-COV-2 RNA RESP QL NAA+PROBE: POSITIVE

## 2024-07-12 PROCEDURE — 0241U POCT CEPHEID COV-2, FLU A/B, RSV - PCR: CPT | Performed by: INTERNAL MEDICINE

## 2024-07-17 ENCOUNTER — APPOINTMENT (OUTPATIENT)
Dept: INTERNAL MEDICINE | Facility: IMAGING CENTER | Age: 69
End: 2024-07-17
Payer: MEDICARE

## 2024-10-29 DIAGNOSIS — Z12.11 SCREENING FOR COLORECTAL CANCER: ICD-10-CM

## 2024-10-29 DIAGNOSIS — Z12.12 SCREENING FOR COLORECTAL CANCER: ICD-10-CM

## 2024-10-29 DIAGNOSIS — K57.30 DIVERTICULOSIS OF COLON: ICD-10-CM

## 2024-10-29 DIAGNOSIS — Z80.0 FAMILY HISTORY OF COLON CANCER: ICD-10-CM

## 2024-11-21 ENCOUNTER — TELEPHONE (OUTPATIENT)
Dept: INTERNAL MEDICINE | Facility: IMAGING CENTER | Age: 69
End: 2024-11-21
Payer: COMMERCIAL

## 2024-11-22 NOTE — TELEPHONE ENCOUNTER
URI x 14 days, continued cough, yellowish sputum.  No fever, no shortness of breath.  Coughing mostly when laying supine.  Dr Perez recommend afrin nasal spray x 4-5 days.  If no improvement, call office for appointment.

## 2024-12-17 ENCOUNTER — NON-PROVIDER VISIT (OUTPATIENT)
Dept: INTERNAL MEDICINE | Facility: IMAGING CENTER | Age: 69
End: 2024-12-17
Payer: MEDICARE

## 2024-12-17 ENCOUNTER — HOSPITAL ENCOUNTER (OUTPATIENT)
Facility: MEDICAL CENTER | Age: 69
End: 2024-12-17
Attending: INTERNAL MEDICINE
Payer: MEDICARE

## 2024-12-17 DIAGNOSIS — R73.01 ELEVATED FASTING GLUCOSE: ICD-10-CM

## 2024-12-17 DIAGNOSIS — N40.1 BPH ASSOCIATED WITH NOCTURIA: ICD-10-CM

## 2024-12-17 DIAGNOSIS — R53.83 OTHER FATIGUE: ICD-10-CM

## 2024-12-17 DIAGNOSIS — R35.1 BPH ASSOCIATED WITH NOCTURIA: ICD-10-CM

## 2024-12-17 DIAGNOSIS — Z86.39 HISTORY OF NON ANEMIC VITAMIN B12 DEFICIENCY: ICD-10-CM

## 2024-12-17 DIAGNOSIS — R79.89 ABNORMAL CBC: ICD-10-CM

## 2024-12-17 DIAGNOSIS — E55.9 VITAMIN D DEFICIENCY: ICD-10-CM

## 2024-12-17 DIAGNOSIS — E78.5 HYPERLIPIDEMIA, UNSPECIFIED HYPERLIPIDEMIA TYPE: ICD-10-CM

## 2024-12-17 LAB
25(OH)D3 SERPL-MCNC: 42 NG/ML (ref 30–100)
ALBUMIN SERPL BCP-MCNC: 4.8 G/DL (ref 3.2–4.9)
ALBUMIN/GLOB SERPL: 1.8 G/DL
ALP SERPL-CCNC: 65 U/L (ref 30–99)
ALT SERPL-CCNC: 30 U/L (ref 2–50)
ANION GAP SERPL CALC-SCNC: 13 MMOL/L (ref 7–16)
APPEARANCE UR: CLEAR
AST SERPL-CCNC: 23 U/L (ref 12–45)
BASOPHILS # BLD AUTO: 0.6 % (ref 0–1.8)
BASOPHILS # BLD: 0.04 K/UL (ref 0–0.12)
BILIRUB SERPL-MCNC: 0.7 MG/DL (ref 0.1–1.5)
BILIRUB UR QL STRIP.AUTO: NEGATIVE
BUN SERPL-MCNC: 19 MG/DL (ref 8–22)
CALCIUM ALBUM COR SERPL-MCNC: 8.5 MG/DL (ref 8.5–10.5)
CALCIUM SERPL-MCNC: 9.1 MG/DL (ref 8.5–10.5)
CHLORIDE SERPL-SCNC: 99 MMOL/L (ref 96–112)
CHOLEST SERPL-MCNC: 191 MG/DL (ref 100–199)
CO2 SERPL-SCNC: 23 MMOL/L (ref 20–33)
COLOR UR: YELLOW
CREAT SERPL-MCNC: 1.05 MG/DL (ref 0.5–1.4)
EOSINOPHIL # BLD AUTO: 0.09 K/UL (ref 0–0.51)
EOSINOPHIL NFR BLD: 1.3 % (ref 0–6.9)
ERYTHROCYTE [DISTWIDTH] IN BLOOD BY AUTOMATED COUNT: 46 FL (ref 35.9–50)
GFR SERPLBLD CREATININE-BSD FMLA CKD-EPI: 75 ML/MIN/1.73 M 2
GLOBULIN SER CALC-MCNC: 2.7 G/DL (ref 1.9–3.5)
GLUCOSE SERPL-MCNC: 127 MG/DL (ref 65–99)
GLUCOSE UR STRIP.AUTO-MCNC: NEGATIVE MG/DL
HCT VFR BLD AUTO: 45.3 % (ref 42–52)
HDLC SERPL-MCNC: 56 MG/DL
HGB BLD-MCNC: 15 G/DL (ref 14–18)
IMM GRANULOCYTES # BLD AUTO: 0.02 K/UL (ref 0–0.11)
IMM GRANULOCYTES NFR BLD AUTO: 0.3 % (ref 0–0.9)
KETONES UR STRIP.AUTO-MCNC: NEGATIVE MG/DL
LDLC SERPL CALC-MCNC: 99 MG/DL
LEUKOCYTE ESTERASE UR QL STRIP.AUTO: NEGATIVE
LYMPHOCYTES # BLD AUTO: 1.37 K/UL (ref 1–4.8)
LYMPHOCYTES NFR BLD: 19.5 % (ref 22–41)
MCH RBC QN AUTO: 31.7 PG (ref 27–33)
MCHC RBC AUTO-ENTMCNC: 33.1 G/DL (ref 32.3–36.5)
MCV RBC AUTO: 95.8 FL (ref 81.4–97.8)
MICRO URNS: NORMAL
MONOCYTES # BLD AUTO: 0.74 K/UL (ref 0–0.85)
MONOCYTES NFR BLD AUTO: 10.5 % (ref 0–13.4)
NEUTROPHILS # BLD AUTO: 4.76 K/UL (ref 1.82–7.42)
NEUTROPHILS NFR BLD: 67.8 % (ref 44–72)
NITRITE UR QL STRIP.AUTO: NEGATIVE
NRBC # BLD AUTO: 0 K/UL
NRBC BLD-RTO: 0 /100 WBC (ref 0–0.2)
PH UR STRIP.AUTO: 6 [PH] (ref 5–8)
PLATELET # BLD AUTO: 192 K/UL (ref 164–446)
PMV BLD AUTO: 10.3 FL (ref 9–12.9)
POTASSIUM SERPL-SCNC: 4.4 MMOL/L (ref 3.6–5.5)
PROT SERPL-MCNC: 7.5 G/DL (ref 6–8.2)
PROT UR QL STRIP: NEGATIVE MG/DL
PSA SERPL DL<=0.01 NG/ML-MCNC: 2.03 NG/ML (ref 0–4)
RBC # BLD AUTO: 4.73 M/UL (ref 4.7–6.1)
RBC UR QL AUTO: NEGATIVE
SODIUM SERPL-SCNC: 135 MMOL/L (ref 135–145)
SP GR UR STRIP.AUTO: 1
TRIGL SERPL-MCNC: 182 MG/DL (ref 0–149)
UROBILINOGEN UR STRIP.AUTO-MCNC: 0.2 EU/DL
VIT B12 SERPL-MCNC: 971 PG/ML (ref 211–911)
WBC # BLD AUTO: 7 K/UL (ref 4.8–10.8)

## 2024-12-17 PROCEDURE — 80053 COMPREHEN METABOLIC PANEL: CPT

## 2024-12-17 PROCEDURE — 82306 VITAMIN D 25 HYDROXY: CPT

## 2024-12-17 PROCEDURE — 36415 COLL VENOUS BLD VENIPUNCTURE: CPT

## 2024-12-17 PROCEDURE — 99000 SPECIMEN HANDLING OFFICE-LAB: CPT

## 2024-12-17 PROCEDURE — 84403 ASSAY OF TOTAL TESTOSTERONE: CPT

## 2024-12-17 PROCEDURE — 84402 ASSAY OF FREE TESTOSTERONE: CPT

## 2024-12-17 PROCEDURE — 81003 URINALYSIS AUTO W/O SCOPE: CPT

## 2024-12-17 PROCEDURE — 85025 COMPLETE CBC W/AUTO DIFF WBC: CPT

## 2024-12-17 PROCEDURE — 84270 ASSAY OF SEX HORMONE GLOBUL: CPT

## 2024-12-17 PROCEDURE — 82607 VITAMIN B-12: CPT

## 2024-12-17 PROCEDURE — 80061 LIPID PANEL: CPT

## 2024-12-17 PROCEDURE — 84153 ASSAY OF PSA TOTAL: CPT

## 2024-12-17 NOTE — PROGRESS NOTES
Mehul Peraza is a 69 y.o. male here for a non-provider visit for a lab draw on 12/17/2024 at 8:55 AM.    Procedure performed:  Venipuncture     Anatomical site:  Right Antecubital Area    Equipment used:  23 g butterfly     Labs drawn:  annual labs    Ordering provider:  Guilherme Perez MD    Lab draw completed by:  Filomena Vinson R.N.

## 2024-12-19 LAB
SHBG SERPL-SCNC: 34 NMOL/L (ref 19–76)
TESTOST FREE MFR SERPL: 1.8 % (ref 1.6–2.9)
TESTOST FREE SERPL-MCNC: 69 PG/ML (ref 47–244)
TESTOST SERPL-MCNC: 383 NG/DL (ref 300–720)

## 2024-12-26 ENCOUNTER — NON-PROVIDER VISIT (OUTPATIENT)
Dept: INTERNAL MEDICINE | Facility: IMAGING CENTER | Age: 69
End: 2024-12-26
Payer: MEDICARE

## 2024-12-26 DIAGNOSIS — R73.01 ELEVATED FASTING GLUCOSE: ICD-10-CM

## 2024-12-26 LAB
HBA1C MFR BLD: 6.8 % (ref ?–5.8)
POCT INT CON NEG: NEGATIVE
POCT INT CON POS: POSITIVE

## 2024-12-26 PROCEDURE — 83036 HEMOGLOBIN GLYCOSYLATED A1C: CPT | Performed by: INTERNAL MEDICINE

## 2024-12-26 NOTE — PROGRESS NOTES
Mehul Franco Surekhalingcandie is a 69 y.o. male here for a non-provider visit for POC A1c    If abnormal was an in office provider notified today (if so, indicate provider)? Yes    Routed to PCP? Yes

## 2025-01-08 ENCOUNTER — APPOINTMENT (OUTPATIENT)
Dept: INTERNAL MEDICINE | Facility: IMAGING CENTER | Age: 70
End: 2025-01-08
Payer: MEDICARE

## 2025-01-08 VITALS
HEART RATE: 70 BPM | DIASTOLIC BLOOD PRESSURE: 64 MMHG | HEIGHT: 70 IN | RESPIRATION RATE: 14 BRPM | TEMPERATURE: 97.2 F | SYSTOLIC BLOOD PRESSURE: 124 MMHG | BODY MASS INDEX: 27.92 KG/M2 | OXYGEN SATURATION: 98 % | WEIGHT: 195 LBS

## 2025-01-08 DIAGNOSIS — Z86.718 HISTORY OF DVT (DEEP VEIN THROMBOSIS): ICD-10-CM

## 2025-01-08 DIAGNOSIS — E11.9 TYPE 2 DIABETES MELLITUS WITHOUT COMPLICATION, WITHOUT LONG-TERM CURRENT USE OF INSULIN (HCC): ICD-10-CM

## 2025-01-08 DIAGNOSIS — N40.1 BPH ASSOCIATED WITH NOCTURIA: ICD-10-CM

## 2025-01-08 DIAGNOSIS — Z00.00 MEDICARE ANNUAL WELLNESS VISIT, SUBSEQUENT: ICD-10-CM

## 2025-01-08 DIAGNOSIS — E78.5 HYPERLIPIDEMIA, UNSPECIFIED HYPERLIPIDEMIA TYPE: ICD-10-CM

## 2025-01-08 DIAGNOSIS — D68.51 FACTOR 5 LEIDEN MUTATION, HETEROZYGOUS (HCC): ICD-10-CM

## 2025-01-08 DIAGNOSIS — Z80.0 FAMILY HISTORY OF COLON CANCER: ICD-10-CM

## 2025-01-08 DIAGNOSIS — R35.1 BPH ASSOCIATED WITH NOCTURIA: ICD-10-CM

## 2025-01-08 PROBLEM — R73.03 PREDIABETES: Status: RESOLVED | Noted: 2018-06-18 | Resolved: 2025-01-08

## 2025-01-08 PROBLEM — D68.59 HYPERCOAGULATION SYNDROME (HCC): Status: RESOLVED | Noted: 2018-06-18 | Resolved: 2025-01-08

## 2025-01-08 PROCEDURE — G0439 PPPS, SUBSEQ VISIT: HCPCS | Performed by: INTERNAL MEDICINE

## 2025-01-08 PROCEDURE — 3078F DIAST BP <80 MM HG: CPT | Performed by: INTERNAL MEDICINE

## 2025-01-08 PROCEDURE — 99999 PR NO CHARGE: CPT | Performed by: INTERNAL MEDICINE

## 2025-01-08 PROCEDURE — 3074F SYST BP LT 130 MM HG: CPT | Performed by: INTERNAL MEDICINE

## 2025-01-08 ASSESSMENT — PATIENT HEALTH QUESTIONNAIRE - PHQ9: CLINICAL INTERPRETATION OF PHQ2 SCORE: 0

## 2025-01-08 ASSESSMENT — ENCOUNTER SYMPTOMS: GENERAL WELL-BEING: GOOD

## 2025-01-08 ASSESSMENT — FIBROSIS 4 INDEX: FIB4 SCORE: 1.55

## 2025-01-08 ASSESSMENT — ACTIVITIES OF DAILY LIVING (ADL): BATHING_REQUIRES_ASSISTANCE: 0

## 2025-01-08 NOTE — PROGRESS NOTES
69 y.o. male presents for the following:    Patient comes in for annual health risk assessment, physical and review of laboratory.  He considers himself in fair health.  He has not been exercising or eating as healthy.  His exercises related to new onset low back pain.  He was seen at [facility] and sent for physical therapy which she did not find helpful.  He denies depression.  No balance issues or cognitive issues.    He has a history of prediabetes.  His A1c is now 6.8.  He thinks this is related to lifestyle changes related to his back.  He also admits that he overindulge during the holidays including more alcohol than usual.  He is not on any medications to treat his blood sugars.    He has a history of BPH.  His PSA varies but has been relatively stable.  He has nocturia x 4 and weak stream.  He is not interested in pharmacotherapy.    He has a history of DVT and pulmonary embolism.  He was diagnosed with factor V Leiden mutation.  He is on chronic Xarelto.    He has a family history of colon cancer.  He is due for colonoscopy.  No bowel complaints.    Annual Wellness Visit/Health Risk Assessment:    Past medical:  Past Medical History:   Diagnosis Date    B12 deficiency 11/8/2019    BPH associated with nocturia 11/8/2019    Diverticulosis of colon 11/9/2019    Colonoscopy Dec. 2014: Sigmoid diverticulosis mild    Factor 5 Leiden mutation, heterozygous (HCC) 6/18/2018    Family history of colon cancer 6/18/2018    History of DVT (deep vein thrombosis) 6/18/2018 2003 nonprovoked and July 2016 RLE nonprovoked    History of pulmonary embolus (PE) 6/18/2018    Hypercoagulation syndrome (HCC) 6/18/2018    Long term current use of anticoagulant 6/18/2018    Prediabetes 6/18/2018    Vitamin D deficiency 6/18/2018       Past surgical:  Past Surgical History:   Procedure Laterality Date    APPENDECTOMY      Age 6    HERNIA REPAIR Bilateral     UMBILICAL HERNIA REPAIR         Family history: relating to possible risk  factors for your patient  Family History   Problem Relation Age of Onset    Cancer Mother         colon    Clotting Disorder Mother         Factor 5 Leiden Mutation (hypercoagulation)    Colon Cancer Mother     Cancer Father         Pancreatic cancer    Diabetes Father     Genetic Disorder Sister         Factor 5    Clotting Disorder Sister     Thyroid Neg Hx        Current Providers (including home care/DME’s):   No Patient Care Coordination Note on file.      Patient Care Team:  Guilherme Perez M.D. as PCP - General (Internal Medicine)  KALPANA Aguilar R.N.      Medications:   Current Outpatient Medications Ordered in Epic   Medication Sig Dispense Refill    rivaroxaban (XARELTO) 20 MG Tab tablet Take 1 Tablet by mouth with dinner. 90 Tablet 3    ketoconazole (NIZORAL) 2 % Cream       famotidine (PEPCID) 20 MG Tab Take 1 Tablet by mouth every day.      Cyanocobalamin (VITAMIN B-12) 5000 MCG SL Tab Place 5,000 mcg under tongue every day.      Cholecalciferol (VITAMIN D) 2000 units Cap Take 1 Capsule by mouth every day. 30 Cap      No current Epic-ordered facility-administered medications on file.       Supplements (calcium/vitamins): if not lisited in medications    Chief Complaint   Patient presents with    Annual Exam         HPI:  Mehul Peraza is a 69 y.o. here for Medicare Annual Wellness Visit     Patient Active Problem List    Diagnosis Date Noted    Type 2 diabetes mellitus without complication, without long-term current use of insulin (HCC) 01/08/2025    Chronic pansinusitis 02/15/2023    Hypertrophy of nasal turbinates 02/15/2023    Diverticulosis of colon 11/09/2019    B12 deficiency 11/08/2019    BPH associated with nocturia 11/08/2019    Factor 5 Leiden mutation, heterozygous (HCC) 06/18/2018    History of pulmonary embolus (PE) 06/18/2018    History of DVT (deep vein thrombosis), nonprovoked 06/18/2018    Vitamin D deficiency 06/18/2018    Family history of colon  cancer 06/18/2018    Long term current use of anticoagulant 06/18/2018       Current Outpatient Medications   Medication Sig Dispense Refill    rivaroxaban (XARELTO) 20 MG Tab tablet Take 1 Tablet by mouth with dinner. 90 Tablet 3    ketoconazole (NIZORAL) 2 % Cream       famotidine (PEPCID) 20 MG Tab Take 1 Tablet by mouth every day.      Cyanocobalamin (VITAMIN B-12) 5000 MCG SL Tab Place 5,000 mcg under tongue every day.      Cholecalciferol (VITAMIN D) 2000 units Cap Take 1 Capsule by mouth every day. 30 Cap      No current facility-administered medications for this visit.            Current supplements as per medication list.       Allergies: Patient has no known allergies.    Current social contact/activities:  Social with friends and family..    He  reports that he has never smoked. He has never used smokeless tobacco. He reports current alcohol use. He reports that he does not use drugs.  Counseling given: Not Answered        DPA/Advanced Directive: In epic      ROS:    Gait: Uses : None  Ostomy: No  Other tubes: no   Amputations: no   Chronic oxygen use: no   Last eye exam: Yearly exam.  : Denies any urinary leakage during the last 6 months incontinence.       Screening:  No Patient Care Coordination Note on file.      Depression Screening  Little interest or pleasure in doing things?  0 - not at all  Feeling down, depressed , or hopeless? 0 - not at all  Patient Health Questionnaire Score: 0     If depressive symptoms identified deferred to follow up visit unless specifically addressed in assessment and plan.    Interpretation of PHQ-9 Total Score   Score Severity   1-4 No Depression   5-9 Mild Depression   10-14 Moderate Depression   15-19 Moderately Severe Depression   20-27 Severe Depression    Screening for Cognitive Impairment  Do you or any of your friends or family members have any concern about your memory? No  Three Minute Recall (Leader, Season, Table) 3/3    Shamir clock face with all 12 numbers  and set the hands to show 10 minutes after 11.  Yes    Cognitive concerns identified deferred for follow up unless specifically addressed in assessment and plan.    Fall Risk Assessment  Has the patient had two or more falls in the last year or any fall with injury in the last year?  No    Safety Assessment  Do you always wear your seatbelt?  Yes  Any changes to home needed to function safely? No  Difficulty hearing.  No  Patient counseled about all safety risks that were identified.    Functional Assessment ADLs  Are there any barriers preventing you from cooking for yourself or meeting nutritional needs?  No.    Are there any barriers preventing you from driving safely or obtaining transportation?  No.    Are there any barriers preventing you from using a telephone or calling for help?  No    Are there any barriers preventing you from shopping?  No.    Are there any barriers preventing you from taking care of your own finances?  No    Are there any barriers preventing you from managing your medications?  No    Are there any barriers preventing you from showering, bathing or dressing yourself? No    Are there any barriers preventing you from doing housework or laundry? No  Are there any barriers preventing you from using the toilet?No  Are you currently engaging in any exercise or physical activity?  Yes.      Self-Assessment of Health  What is your perception of your health? Good    Do you sleep more than six hours a night? Yes    In the past 7 days, how much did pain keep you from doing your normal work? None    Do you spend quality time with family or friends (virtually or in person)? Yes    Do you usually eat a heart healthy diet that constists of a variety of fruits, vegetables, whole grains and fiber? Yes    Do you eat foods high in fat and/or Fast Food more than three times per week? No    How concerned are you that your medical conditions are not being well managed? Not at all    Are you worried that in the  next 2 months, you may not have stable housing that you own, rent, or stay in as part of a household? No      Advance Care Planning  Do you have an Advance Directive, Living Will, Durable Power of , or POLST? Yes                 Health Maintenance Summary            Overdue - IMM DTaP/Tdap/Td Vaccine (2 - Td or Tdap) Overdue since 10/1/2024      10/01/2014  Imm Admin: Tdap Vaccine              Overdue - Colorectal Cancer Screening (Colonoscopy - Every 10 Years) Overdue since 12/8/2024 12/08/2014  REFERRAL TO GI FOR COLONOSCOPY              Annual Wellness Visit (Yearly) Next due on 1/8/2026 01/08/2025  Visit Dx: Medicare annual wellness visit, subsequent    01/03/2024  Visit Dx: Medicare annual wellness visit, subsequent    11/09/2022  Visit Dx: Medicare annual wellness visit, subsequent    11/03/2021  Visit Dx: Medicare annual wellness visit, subsequent    11/08/2019  Done    Only the first 5 history entries have been loaded, but more history exists.              Hepatitis C Screening  Tentatively Complete      10/02/2019  Hepatitis C Antibody component of HEP C VIRUS ANTIBODY              Pneumococcal Vaccine: 65+ Years (Series Information) Completed      11/08/2019  Imm Admin: Pneumococcal polysaccharide vaccine (PPSV-23)    10/01/2017  Imm Admin: Pneumococcal Conjugate Vaccine (Prevnar/PCV-13)              Zoster (Shingles) Vaccines (Series Information) Completed      07/08/2020  Imm Admin: Zoster Vaccine Recombinant (RZV) (SHINGRIX)    11/23/2019  Imm Admin: Zoster Vaccine Recombinant (RZV) (SHINGRIX)    10/01/2014  Imm Admin: Zoster Vaccine Live (ZVL) (Zostavax) - HISTORICAL DATA              Influenza Vaccine (Series Information) Completed      12/16/2024  Imm Admin: Influenza high-dose trivalent (PF)    10/19/2023  Imm Admin: Influenza Vaccine, Quadrivalent, Adjuvanted (Pf)    10/01/2023  Imm Admin: Influenza Vaccine Adult HD    10/21/2022  Imm Admin: Influenza Vaccine Adult HD     10/15/2021  Imm Admin: Influenza Vaccine Adult HD    Only the first 5 history entries have been loaded, but more history exists.              COVID-19 Vaccine (Series Information) Completed      12/16/2024  Imm Admin: COVID-19, mRNA, LNP-S, PF, fe-sucrose, 30 mcg/0.3 mL    10/19/2023  Imm Admin: Covid-19 Mrna (Spikevax) Moderna 12+ Years    10/01/2023  Imm Admin: Pfizer-biontech Covid-19 Vaccine (3402-3111 Formula)    11/15/2021  Imm Admin: MODERNA SARS-COV-2 VACCINE (12+)    04/01/2021  Imm Admin: MODERNA SARS-COV-2 VACCINE (12+)    Only the first 5 history entries have been loaded, but more history exists.              Hepatitis A Vaccine (Hep A) (Series Information) Aged Out      No completion history exists for this topic.              HPV Vaccines (Series Information) Aged Out      No completion history exists for this topic.              Polio Vaccine (Inactivated Polio) (Series Information) Aged Out      No completion history exists for this topic.              Meningococcal Immunization (Series Information) Aged Out      No completion history exists for this topic.              Discontinued - Diabetes: Retinopathy Screening  Discontinued        Frequency changed to Never automatically (Topic No Longer Applies)    12/01/2021  Referral for Retinal Screening Exam    01/29/2020  REFERRAL FOR RETINAL SCREENING EXAM              Discontinued - A1c Screening  Discontinued        Frequency changed to Never automatically (Topic No Longer Applies)    12/26/2024  POCT  A1C    02/09/2024  HEMOGLOBIN A1C    10/21/2022  HEMOGLOBIN A1C    10/15/2021  HEMOGLOBIN A1C    Only the first 5 history entries have been loaded, but more history exists.              Discontinued - Fasting Lipid Profile  Discontinued        Frequency changed to Never automatically (Topic No Longer Applies)    12/17/2024  Lipid Profile    02/09/2024  Lipid Profile    10/21/2022  Lipid Profile    10/15/2021  Lipid Profile    Only the first 5 history  entries have been loaded, but more history exists.              Discontinued - Diabetes: Urine Protein Screening  Discontinued        Frequency changed to Never automatically (Topic No Longer Applies)    10/02/2019  MICROALBUMIN CREAT RATIO URINE    06/19/2018  MICROALBUMIN CREAT RATIO URINE              Discontinued - SERUM CREATININE  Discontinued        Frequency changed to Never automatically (Topic No Longer Applies)    12/17/2024  Comp Metabolic Panel    02/09/2024  Comp Metabolic Panel    10/21/2022  Comp Metabolic Panel    10/15/2021  Comp Metabolic Panel    Only the first 5 history entries have been loaded, but more history exists.              Discontinued - Hepatitis B Vaccine (Hep B)  Discontinued      No completion history exists for this topic.                    Patient Care Team:  Guilherme Perez M.D. as PCP - General (Internal Medicine)  KALPANA Aguilar R.N.        Social History     Tobacco Use    Smoking status: Never    Smokeless tobacco: Never   Substance Use Topics    Alcohol use: Yes     Comment: 5-6 mixed drinks a week    Drug use: No     Family History   Problem Relation Age of Onset    Cancer Mother         colon    Clotting Disorder Mother         Factor 5 Leiden Mutation (hypercoagulation)    Colon Cancer Mother     Cancer Father         Pancreatic cancer    Diabetes Father     Genetic Disorder Sister         Factor 5    Clotting Disorder Sister     Thyroid Neg Hx      He  has a past medical history of B12 deficiency (11/8/2019), BPH associated with nocturia (11/8/2019), Diverticulosis of colon (11/9/2019), Factor 5 Leiden mutation, heterozygous (HCC) (6/18/2018), Family history of colon cancer (6/18/2018), History of DVT (deep vein thrombosis) (6/18/2018), History of pulmonary embolus (PE) (6/18/2018), Hypercoagulation syndrome (HCC) (6/18/2018), Long term current use of anticoagulant (6/18/2018), Prediabetes (6/18/2018), and Vitamin D deficiency (6/18/2018).  "  Past Surgical History:   Procedure Laterality Date    APPENDECTOMY      Age 6    HERNIA REPAIR Bilateral     UMBILICAL HERNIA REPAIR         Exam:     /64 (BP Location: Left arm, Patient Position: Sitting, BP Cuff Size: Adult)   Pulse 70   Temp 36.2 °C (97.2 °F) (Temporal)   Resp 14   Ht 1.778 m (5' 10\")   Wt 88.5 kg (195 lb)   SpO2 98%  Body mass index is 27.98 kg/m².    Hearing good.    Dentition good  Alert, oriented in no acute distress.  Eye contact is good, speech goal directed, affect calm  General: Mildly overweight.  No distress.  Normal appearing.  HEENT: Pupils are equal.  Conjunctiva is normal.  Head is normal appearing.  Ears, canals and tympanic membranes are normal.  Oral cavity is pink and moist without lesion.  Neck: Supple without JVD or bruit.  Thyroid is not enlarged.  Pulmonary: Clear with good breath sounds.  Cardiovascular regular rate and rhythm.  No murmur auscultated.  Carotid, radial and pedal pulses are intact.  Abdomen: Soft, nontender, nondistended.  Normal bowel sounds.  Organs are not enlarged.  Neurologic: Cranial nerves intact.  Strength and sensation are normal.  Normal patellar reflex.  Skin: No obvious lesions  Lymph: No cervical, supraclavicular, axillary, abdominal or inguinal adenopathy noted.  Genitourinary:  Rectal tone is normal.    Prostate is not enlarged, soft and without nodule.  The right lobe of the prostate is slightly larger than the left    Assessment and Plan. The following treatment and monitoring plan is recommended:    1. Medicare annual wellness visit, subsequent        2. Type 2 diabetes mellitus without complication, without long-term current use of insulin ([facility])        3. Hyperlipidemia, unspecified hyperlipidemia type        4. BPH associated with nocturia        5. Factor 5 Leiden mutation, heterozygous (HCC)        6. History of DVT (deep vein thrombosis), nonprovoked        7. Family history of colon cancer      "         Assessment/plan:    69-year-old male.  He remains in good health.  He would benefit from weight loss.  He will call to get colonoscopy scheduled  He is up-to-date on immunizations    We discussed his blood sugars.  He is now a type II diabetic based on A1c.  He would like to work on lifestyle.  Since he saw the labs he quit alcohol and has decreased his carbohydrates.  He plans to resume exercise.    We discussed his back issues.  We will obtain a copy of the MRI and make recommendations based on the results.  He tried 3 months of physical therapy with no improvement.  He may be a candidate for epidural or other.    He will continue with Xarelto lifelong.  He has had 2 DVTs and a pulmonary embolism.  He has an upcoming colonoscopy and I think it is reasonable to temporarily discontinue the Xarelto.    He has BPH symptoms.  He is not certain pharmacotherapy.  His exam is unchanged from last year.    Services suggested: No services required at this time  Health Care Screening: Age-appropriate preventive services Medicare covers discussed today and ordered if indicated.  Referrals offered: Community-based lifestyle interventions to reduce health risks and promote self-management and wellness, fall prevention, nutrition, physical activity, tobacco-use cessation, weight loss, and mental health services as per orders if indicated.    Discussion today about general wellness and lifestyle habits:    Prevent falls and reduce trip hazards; Cautioned about securing or removing rugs.  Have a working fire alarm and carbon monoxide detector;   Engage in regular physical activity and social activities       Follow-up: 3-month laboratory follow-up

## 2025-05-08 ENCOUNTER — APPOINTMENT (OUTPATIENT)
Dept: INTERNAL MEDICINE | Facility: IMAGING CENTER | Age: 70
End: 2025-05-08
Payer: MEDICARE

## 2025-05-08 ENCOUNTER — HOSPITAL ENCOUNTER (OUTPATIENT)
Facility: MEDICAL CENTER | Age: 70
End: 2025-05-08
Attending: INTERNAL MEDICINE
Payer: MEDICARE

## 2025-05-08 DIAGNOSIS — N40.1 BPH ASSOCIATED WITH NOCTURIA: ICD-10-CM

## 2025-05-08 DIAGNOSIS — E11.9 TYPE 2 DIABETES MELLITUS WITHOUT COMPLICATION, WITHOUT LONG-TERM CURRENT USE OF INSULIN (HCC): ICD-10-CM

## 2025-05-08 DIAGNOSIS — R35.1 BPH ASSOCIATED WITH NOCTURIA: ICD-10-CM

## 2025-05-08 DIAGNOSIS — E78.5 HYPERLIPIDEMIA, UNSPECIFIED HYPERLIPIDEMIA TYPE: ICD-10-CM

## 2025-05-08 LAB
ALBUMIN SERPL BCP-MCNC: 4.8 G/DL (ref 3.2–4.9)
ALBUMIN/GLOB SERPL: 1.7 G/DL
ALP SERPL-CCNC: 58 U/L (ref 30–99)
ALT SERPL-CCNC: 40 U/L (ref 2–50)
ANION GAP SERPL CALC-SCNC: 10 MMOL/L (ref 7–16)
APPEARANCE UR: CLEAR
AST SERPL-CCNC: 33 U/L (ref 12–45)
BASOPHILS # BLD AUTO: 0.9 % (ref 0–1.8)
BASOPHILS # BLD: 0.05 K/UL (ref 0–0.12)
BILIRUB SERPL-MCNC: 0.6 MG/DL (ref 0.1–1.5)
BILIRUB UR QL STRIP.AUTO: NEGATIVE
BUN SERPL-MCNC: 20 MG/DL (ref 8–22)
CALCIUM ALBUM COR SERPL-MCNC: 8.8 MG/DL (ref 8.5–10.5)
CALCIUM SERPL-MCNC: 9.4 MG/DL (ref 8.5–10.5)
CHLORIDE SERPL-SCNC: 102 MMOL/L (ref 96–112)
CHOLEST SERPL-MCNC: 200 MG/DL (ref 100–199)
CO2 SERPL-SCNC: 23 MMOL/L (ref 20–33)
COLOR UR: YELLOW
CREAT SERPL-MCNC: 1.18 MG/DL (ref 0.5–1.4)
CREAT UR-MCNC: 16.7 MG/DL
EOSINOPHIL # BLD AUTO: 0.07 K/UL (ref 0–0.51)
EOSINOPHIL NFR BLD: 1.2 % (ref 0–6.9)
ERYTHROCYTE [DISTWIDTH] IN BLOOD BY AUTOMATED COUNT: 47.5 FL (ref 35.9–50)
EST. AVERAGE GLUCOSE BLD GHB EST-MCNC: 137 MG/DL
GFR SERPLBLD CREATININE-BSD FMLA CKD-EPI: 65 ML/MIN/1.73 M 2
GLOBULIN SER CALC-MCNC: 2.9 G/DL (ref 1.9–3.5)
GLUCOSE SERPL-MCNC: 120 MG/DL (ref 65–99)
GLUCOSE UR STRIP.AUTO-MCNC: NEGATIVE MG/DL
HBA1C MFR BLD: 6.4 % (ref 4–5.6)
HCT VFR BLD AUTO: 45.2 % (ref 42–52)
HDLC SERPL-MCNC: 58 MG/DL
HGB BLD-MCNC: 15 G/DL (ref 14–18)
IMM GRANULOCYTES # BLD AUTO: 0.03 K/UL (ref 0–0.11)
IMM GRANULOCYTES NFR BLD AUTO: 0.5 % (ref 0–0.9)
KETONES UR STRIP.AUTO-MCNC: NEGATIVE MG/DL
LDLC SERPL CALC-MCNC: 118 MG/DL
LEUKOCYTE ESTERASE UR QL STRIP.AUTO: NEGATIVE
LYMPHOCYTES # BLD AUTO: 1.91 K/UL (ref 1–4.8)
LYMPHOCYTES NFR BLD: 33.3 % (ref 22–41)
MCH RBC QN AUTO: 32.2 PG (ref 27–33)
MCHC RBC AUTO-ENTMCNC: 33.2 G/DL (ref 32.3–36.5)
MCV RBC AUTO: 97 FL (ref 81.4–97.8)
MICRO URNS: NORMAL
MICROALBUMIN UR-MCNC: <1.2 MG/DL
MICROALBUMIN/CREAT UR: NORMAL MG/G (ref 0–30)
MONOCYTES # BLD AUTO: 0.61 K/UL (ref 0–0.85)
MONOCYTES NFR BLD AUTO: 10.6 % (ref 0–13.4)
NEUTROPHILS # BLD AUTO: 3.07 K/UL (ref 1.82–7.42)
NEUTROPHILS NFR BLD: 53.5 % (ref 44–72)
NITRITE UR QL STRIP.AUTO: NEGATIVE
NRBC # BLD AUTO: 0 K/UL
NRBC BLD-RTO: 0 /100 WBC (ref 0–0.2)
PH UR STRIP.AUTO: 6 [PH] (ref 5–8)
PLATELET # BLD AUTO: 216 K/UL (ref 164–446)
PMV BLD AUTO: 10.5 FL (ref 9–12.9)
POTASSIUM SERPL-SCNC: 4.1 MMOL/L (ref 3.6–5.5)
PROT SERPL-MCNC: 7.7 G/DL (ref 6–8.2)
PROT UR QL STRIP: NEGATIVE MG/DL
PSA SERPL DL<=0.01 NG/ML-MCNC: 1.63 NG/ML (ref 0–4)
RBC # BLD AUTO: 4.66 M/UL (ref 4.7–6.1)
RBC UR QL AUTO: NEGATIVE
SODIUM SERPL-SCNC: 135 MMOL/L (ref 135–145)
SP GR UR STRIP.AUTO: 1
TRIGL SERPL-MCNC: 121 MG/DL (ref 0–149)
UROBILINOGEN UR STRIP.AUTO-MCNC: 0.2 EU/DL
WBC # BLD AUTO: 5.7 K/UL (ref 4.8–10.8)

## 2025-05-08 PROCEDURE — 80053 COMPREHEN METABOLIC PANEL: CPT

## 2025-05-08 PROCEDURE — 83036 HEMOGLOBIN GLYCOSYLATED A1C: CPT

## 2025-05-08 PROCEDURE — 80061 LIPID PANEL: CPT

## 2025-05-08 PROCEDURE — 82043 UR ALBUMIN QUANTITATIVE: CPT

## 2025-05-08 PROCEDURE — 84153 ASSAY OF PSA TOTAL: CPT

## 2025-05-08 PROCEDURE — 85025 COMPLETE CBC W/AUTO DIFF WBC: CPT

## 2025-05-08 PROCEDURE — 82570 ASSAY OF URINE CREATININE: CPT

## 2025-05-08 PROCEDURE — 81003 URINALYSIS AUTO W/O SCOPE: CPT

## 2025-05-08 NOTE — PROGRESS NOTES
Mehul Peraza is a 70 y.o. male here for a non-provider visit for a lab draw on 5/8/2025 at 9:47 AM.    Procedure performed:  Venipuncture     Anatomical site:  Left Antecubital Area    Equipment used:  23 g butterfly     Labs drawn:  annual labs    Ordering provider:  Guilherme Perez MD    Lab draw completed by:  Filomena Vinson R.N.

## 2025-05-15 ENCOUNTER — APPOINTMENT (OUTPATIENT)
Dept: INTERNAL MEDICINE | Facility: IMAGING CENTER | Age: 70
End: 2025-05-15
Payer: MEDICARE

## 2025-05-15 VITALS
DIASTOLIC BLOOD PRESSURE: 70 MMHG | BODY MASS INDEX: 26.98 KG/M2 | WEIGHT: 188 LBS | SYSTOLIC BLOOD PRESSURE: 112 MMHG | RESPIRATION RATE: 12 BRPM | OXYGEN SATURATION: 98 % | TEMPERATURE: 97 F | HEART RATE: 52 BPM

## 2025-05-15 DIAGNOSIS — E11.9 TYPE 2 DIABETES MELLITUS WITHOUT COMPLICATION, WITHOUT LONG-TERM CURRENT USE OF INSULIN (HCC): Primary | ICD-10-CM

## 2025-05-15 DIAGNOSIS — R35.1 BPH ASSOCIATED WITH NOCTURIA: ICD-10-CM

## 2025-05-15 DIAGNOSIS — E78.5 HYPERLIPIDEMIA, UNSPECIFIED HYPERLIPIDEMIA TYPE: ICD-10-CM

## 2025-05-15 DIAGNOSIS — N40.1 BPH ASSOCIATED WITH NOCTURIA: ICD-10-CM

## 2025-05-15 DIAGNOSIS — D68.51 FACTOR 5 LEIDEN MUTATION, HETEROZYGOUS (HCC): ICD-10-CM

## 2025-05-15 DIAGNOSIS — Z86.718 HISTORY OF DVT (DEEP VEIN THROMBOSIS): ICD-10-CM

## 2025-05-15 PROCEDURE — 99213 OFFICE O/P EST LOW 20 MIN: CPT | Performed by: INTERNAL MEDICINE

## 2025-05-15 PROCEDURE — 3078F DIAST BP <80 MM HG: CPT | Performed by: INTERNAL MEDICINE

## 2025-05-15 PROCEDURE — 3074F SYST BP LT 130 MM HG: CPT | Performed by: INTERNAL MEDICINE

## 2025-05-15 ASSESSMENT — FIBROSIS 4 INDEX: FIB4 SCORE: 1.74

## 2025-05-19 NOTE — PROGRESS NOTES
Chief Complaint   Patient presents with    Follow-Up       HISTORY OF THE PRESENT ILLNESS: Patient is a 70 y.o. male.     Patient comes in for follow-up on laboratory and to discuss chronic issues.  He is generally been feeling well.    He has a history of type 2 diabetes.  His last A1c was 6.8.  His current A1c is 6.4 which is more consistent with his previous labs.  He is currently on no oral hypoglycemic agents.  His weight is down 7 pounds.    BPH symptoms have remained stable.  Occasional nocturia.  He is on no medications to treat enlarged prostate.  PSA is improved.  1.6 currently.  2.0 previous.    He has a history of DVT and factor V Leiden mutation.  He remains on chronic Xarelto    Lipids are higher.  LDL cholesterol is 118.  His baseline LDL cholesterol is typically around 100.  No previous atherosclerosis screening.    Allergies: Patient has no known allergies.    Current Medications and Prescriptions Ordered in Epic[1]    Past medical history, social history and family history were reviewed from chart today    Review of systems: Per HPI.    All others negative.     Exam: /70 (BP Location: Left arm, Patient Position: Sitting, BP Cuff Size: Adult)   Pulse (!) 52   Temp 36.1 °C (97 °F) (Temporal)   Resp 12   Wt 85.3 kg (188 lb)   SpO2 98%   General: Well-appearing. Well-developed. No signs of distress.  HEENT: Grossly normal. Oral cavity is pink and moist.   Neck: Supple without JVD or bruit.  Pulmonary: Clear with good breath sounds. Normal effort.  Cardiovascular: Regular. Carotid and radial pulses are intact.  Abdomen: Soft, nontender, nondistended. Spleen and liver are not enlarged.  Neurologic: Cranial nerves II through XII are grossly normal, alert and oriented x3      Diagnosis:  1. Type 2 diabetes mellitus without complication, without long-term current use of insulin (HCC)        2. BPH associated with nocturia        3. Hyperlipidemia, unspecified hyperlipidemia type        4. Factor  5 Leiden mutation, heterozygous (HCC)        5. History of DVT (deep vein thrombosis), nonprovoked          Patient will continue to work on lifestyle.  Discussed metformin to improve blood sugars.  He would like to avoid daily medication.  Prostate issues are stable.  PSA is improved.  Lipids are higher.  Discussed statin therapy with elevated sugars as indicated due to reduction of cardiac risk.  At this time he would like to defer  Consider cardiac CT in the near future.  Continue Xarelto due to history of factor V and DVT    My total time spent caring for the patient on the day of the encounter was  greater than 25 minutes.   This includes obtaining history, reviewing chart, physical exam, patient education, reviewing outside records, placing orders, interpreting tests and coordinating care.    Portions of this note were completed using voice recognition software (Dragon Naturally speaking software) . Occasional transcription errors may have escaped proof reading. I have made every reasonable attempt to correct obvious errors, but I expect that there are errors of grammar and possibly content that I did not discover before finalizing the note.          [1]   Current Outpatient Medications Ordered in Epic   Medication Sig Dispense Refill    rivaroxaban (XARELTO) 20 MG Tab tablet Take 1 Tablet by mouth with dinner. 90 Tablet 3    ketoconazole (NIZORAL) 2 % Cream       famotidine (PEPCID) 20 MG Tab Take 1 Tablet by mouth every day.      Cyanocobalamin (VITAMIN B-12) 5000 MCG SL Tab Place 5,000 mcg under tongue every day.      Cholecalciferol (VITAMIN D) 2000 units Cap Take 1 Capsule by mouth every day. 30 Cap      No current Epic-ordered facility-administered medications on file.

## 2025-05-22 ENCOUNTER — NON-PROVIDER VISIT (OUTPATIENT)
Dept: INTERNAL MEDICINE | Facility: IMAGING CENTER | Age: 70
End: 2025-05-22
Payer: MEDICARE

## 2025-05-22 DIAGNOSIS — J06.9 UPPER RESPIRATORY TRACT INFECTION, UNSPECIFIED TYPE: Primary | ICD-10-CM

## 2025-05-22 LAB
FLUAV RNA SPEC QL NAA+PROBE: NEGATIVE
FLUBV RNA SPEC QL NAA+PROBE: NEGATIVE
RSV RNA SPEC QL NAA+PROBE: NEGATIVE
SARS-COV-2 RNA RESP QL NAA+PROBE: NEGATIVE

## 2025-05-22 PROCEDURE — 0241U POCT CEPHEID COV-2, FLU A/B, RSV - PCR: CPT

## 2025-05-22 NOTE — PROGRESS NOTES
Mehul Peraza is a 70 y.o. male here for a non-provider visit for COVID testing.    Clinic collect COVID order in system?: Yes    Patient tolerated specimen collection and no adverse effects were observed or reported: Yes  Informed of results.  Instructed to treat the symptoms dayquil/niquil, mucinex, cough and cold.

## 2025-06-23 DIAGNOSIS — Z86.718 HISTORY OF DVT (DEEP VEIN THROMBOSIS): ICD-10-CM

## 2025-06-23 DIAGNOSIS — Z86.711 HISTORY OF PULMONARY EMBOLUS (PE): ICD-10-CM

## 2025-06-23 DIAGNOSIS — D68.51 FACTOR 5 LEIDEN MUTATION, HETEROZYGOUS (HCC): ICD-10-CM

## 2025-08-26 ENCOUNTER — NON-PROVIDER VISIT (OUTPATIENT)
Dept: INTERNAL MEDICINE | Facility: IMAGING CENTER | Age: 70
End: 2025-08-26
Payer: MEDICARE

## 2025-08-26 ENCOUNTER — HOSPITAL ENCOUNTER (OUTPATIENT)
Facility: MEDICAL CENTER | Age: 70
End: 2025-08-26
Attending: INTERNAL MEDICINE
Payer: COMMERCIAL

## 2025-08-26 DIAGNOSIS — N40.1 BPH ASSOCIATED WITH NOCTURIA: ICD-10-CM

## 2025-08-26 DIAGNOSIS — E53.8 B12 DEFICIENCY: Primary | ICD-10-CM

## 2025-08-26 DIAGNOSIS — E11.9 TYPE 2 DIABETES MELLITUS WITHOUT COMPLICATION, WITHOUT LONG-TERM CURRENT USE OF INSULIN (HCC): ICD-10-CM

## 2025-08-26 DIAGNOSIS — E55.9 VITAMIN D DEFICIENCY: ICD-10-CM

## 2025-08-26 DIAGNOSIS — E53.8 B12 DEFICIENCY: ICD-10-CM

## 2025-08-26 DIAGNOSIS — R35.1 BPH ASSOCIATED WITH NOCTURIA: ICD-10-CM

## 2025-08-26 LAB
25(OH)D3 SERPL-MCNC: 51 NG/ML (ref 30–100)
ALBUMIN SERPL BCP-MCNC: 4.7 G/DL (ref 3.2–4.9)
ALBUMIN/GLOB SERPL: 1.7 G/DL
ALP SERPL-CCNC: 54 U/L (ref 30–99)
ALT SERPL-CCNC: 44 U/L (ref 2–50)
ANION GAP SERPL CALC-SCNC: 10 MMOL/L (ref 7–16)
APPEARANCE UR: CLEAR
AST SERPL-CCNC: 30 U/L (ref 12–45)
BASOPHILS # BLD AUTO: 0.8 % (ref 0–1.8)
BASOPHILS # BLD: 0.04 K/UL (ref 0–0.12)
BILIRUB SERPL-MCNC: 0.6 MG/DL (ref 0.1–1.5)
BILIRUB UR QL STRIP.AUTO: NEGATIVE
BUN SERPL-MCNC: 22 MG/DL (ref 8–22)
CALCIUM ALBUM COR SERPL-MCNC: 8.7 MG/DL (ref 8.5–10.5)
CALCIUM SERPL-MCNC: 9.3 MG/DL (ref 8.5–10.5)
CHLORIDE SERPL-SCNC: 102 MMOL/L (ref 96–112)
CHOLEST SERPL-MCNC: 204 MG/DL (ref 100–199)
CO2 SERPL-SCNC: 23 MMOL/L (ref 20–33)
COLOR UR: YELLOW
CREAT SERPL-MCNC: 1.16 MG/DL (ref 0.5–1.4)
CREAT UR-MCNC: 15.1 MG/DL
EOSINOPHIL # BLD AUTO: 0.08 K/UL (ref 0–0.51)
EOSINOPHIL NFR BLD: 1.5 % (ref 0–6.9)
ERYTHROCYTE [DISTWIDTH] IN BLOOD BY AUTOMATED COUNT: 46.9 FL (ref 35.9–50)
EST. AVERAGE GLUCOSE BLD GHB EST-MCNC: 134 MG/DL
GFR SERPLBLD CREATININE-BSD FMLA CKD-EPI: 67 ML/MIN/1.73 M 2
GLOBULIN SER CALC-MCNC: 2.7 G/DL (ref 1.9–3.5)
GLUCOSE SERPL-MCNC: 117 MG/DL (ref 65–99)
GLUCOSE UR STRIP.AUTO-MCNC: NEGATIVE MG/DL
HBA1C MFR BLD: 6.3 % (ref 4–5.6)
HCT VFR BLD AUTO: 44.7 % (ref 42–52)
HDLC SERPL-MCNC: 61 MG/DL
HGB BLD-MCNC: 14.4 G/DL (ref 14–18)
IMM GRANULOCYTES # BLD AUTO: 0.02 K/UL (ref 0–0.11)
IMM GRANULOCYTES NFR BLD AUTO: 0.4 % (ref 0–0.9)
KETONES UR STRIP.AUTO-MCNC: NEGATIVE MG/DL
LDLC SERPL CALC-MCNC: 118 MG/DL
LEUKOCYTE ESTERASE UR QL STRIP.AUTO: NEGATIVE
LYMPHOCYTES # BLD AUTO: 2.06 K/UL (ref 1–4.8)
LYMPHOCYTES NFR BLD: 39.2 % (ref 22–41)
MCH RBC QN AUTO: 30.9 PG (ref 27–33)
MCHC RBC AUTO-ENTMCNC: 32.2 G/DL (ref 32.3–36.5)
MCV RBC AUTO: 95.9 FL (ref 81.4–97.8)
MICRO URNS: NORMAL
MICROALBUMIN UR-MCNC: <1.2 MG/DL
MICROALBUMIN/CREAT UR: NORMAL MG/G (ref 0–30)
MONOCYTES # BLD AUTO: 0.55 K/UL (ref 0–0.85)
MONOCYTES NFR BLD AUTO: 10.5 % (ref 0–13.4)
NEUTROPHILS # BLD AUTO: 2.51 K/UL (ref 1.82–7.42)
NEUTROPHILS NFR BLD: 47.6 % (ref 44–72)
NITRITE UR QL STRIP.AUTO: NEGATIVE
NRBC # BLD AUTO: 0 K/UL
NRBC BLD-RTO: 0 /100 WBC (ref 0–0.2)
PH UR STRIP.AUTO: 5.5 [PH] (ref 5–8)
PLATELET # BLD AUTO: 192 K/UL (ref 164–446)
PMV BLD AUTO: 10.1 FL (ref 9–12.9)
POTASSIUM SERPL-SCNC: 4.1 MMOL/L (ref 3.6–5.5)
PROT SERPL-MCNC: 7.4 G/DL (ref 6–8.2)
PROT UR QL STRIP: NEGATIVE MG/DL
PSA SERPL DL<=0.01 NG/ML-MCNC: 1.48 NG/ML (ref 0–4)
RBC # BLD AUTO: 4.66 M/UL (ref 4.7–6.1)
RBC UR QL AUTO: NEGATIVE
SODIUM SERPL-SCNC: 135 MMOL/L (ref 135–145)
SP GR UR STRIP.AUTO: 1
TRIGL SERPL-MCNC: 126 MG/DL (ref 0–149)
UROBILINOGEN UR STRIP.AUTO-MCNC: 0.2 EU/DL
VIT B12 SERPL-MCNC: 1150 PG/ML (ref 211–911)
WBC # BLD AUTO: 5.3 K/UL (ref 4.8–10.8)

## 2025-08-26 PROCEDURE — 80053 COMPREHEN METABOLIC PANEL: CPT

## 2025-08-26 PROCEDURE — 82607 VITAMIN B-12: CPT

## 2025-08-26 PROCEDURE — 83036 HEMOGLOBIN GLYCOSYLATED A1C: CPT

## 2025-08-26 PROCEDURE — 82306 VITAMIN D 25 HYDROXY: CPT

## 2025-08-26 PROCEDURE — 82043 UR ALBUMIN QUANTITATIVE: CPT

## 2025-08-26 PROCEDURE — 84153 ASSAY OF PSA TOTAL: CPT

## 2025-08-26 PROCEDURE — 82570 ASSAY OF URINE CREATININE: CPT

## 2025-08-26 PROCEDURE — 85025 COMPLETE CBC W/AUTO DIFF WBC: CPT

## 2025-08-26 PROCEDURE — 80061 LIPID PANEL: CPT

## 2025-08-26 PROCEDURE — 81003 URINALYSIS AUTO W/O SCOPE: CPT

## 2025-09-03 ENCOUNTER — APPOINTMENT (OUTPATIENT)
Dept: INTERNAL MEDICINE | Facility: IMAGING CENTER | Age: 70
End: 2025-09-03
Payer: MEDICARE